# Patient Record
Sex: MALE | Race: WHITE | Employment: OTHER | URBAN - METROPOLITAN AREA
[De-identification: names, ages, dates, MRNs, and addresses within clinical notes are randomized per-mention and may not be internally consistent; named-entity substitution may affect disease eponyms.]

---

## 2017-03-09 ENCOUNTER — HOSPITAL ENCOUNTER (OUTPATIENT)
Dept: PREADMISSION TESTING | Age: 77
Discharge: HOME | End: 2017-03-09
Payer: MEDICARE

## 2017-03-09 VITALS — BODY MASS INDEX: 29.92 KG/M2 | HEIGHT: 69 IN | WEIGHT: 202 LBS

## 2017-03-09 LAB
ALBUMIN SERPL BCP-MCNC: 3.6 G/DL (ref 3.4–5)
ALBUMIN/GLOB SERPL: 1 {RATIO} (ref 0.8–1.7)
ALP SERPL-CCNC: 166 U/L (ref 45–117)
ALT SERPL-CCNC: 39 U/L (ref 16–61)
ANION GAP BLD CALC-SCNC: 10 MMOL/L (ref 3–18)
APPEARANCE UR: CLEAR
APTT PPP: 30.6 SEC (ref 23–36.4)
AST SERPL W P-5'-P-CCNC: 24 U/L (ref 15–37)
BACTERIA SPEC CULT: NORMAL
BASOPHILS # BLD AUTO: 0 K/UL (ref 0–0.06)
BASOPHILS # BLD: 1 % (ref 0–2)
BILIRUB SERPL-MCNC: 0.5 MG/DL (ref 0.2–1)
BILIRUB UR QL: NEGATIVE
BUN SERPL-MCNC: 21 MG/DL (ref 7–18)
BUN/CREAT SERPL: 23 (ref 12–20)
CALCIUM SERPL-MCNC: 8.7 MG/DL (ref 8.5–10.1)
CHLORIDE SERPL-SCNC: 103 MMOL/L (ref 100–108)
CO2 SERPL-SCNC: 26 MMOL/L (ref 21–32)
COLOR UR: YELLOW
CREAT SERPL-MCNC: 0.9 MG/DL (ref 0.6–1.3)
DIFFERENTIAL METHOD BLD: ABNORMAL
EOSINOPHIL # BLD: 0.1 K/UL (ref 0–0.4)
EOSINOPHIL NFR BLD: 3 % (ref 0–5)
ERYTHROCYTE [DISTWIDTH] IN BLOOD BY AUTOMATED COUNT: 12.4 % (ref 11.6–14.5)
ERYTHROCYTE [SEDIMENTATION RATE] IN BLOOD: 13 MM/HR (ref 0–20)
EST. AVERAGE GLUCOSE BLD GHB EST-MCNC: 126 MG/DL
GLOBULIN SER CALC-MCNC: 3.5 G/DL (ref 2–4)
GLUCOSE SERPL-MCNC: 111 MG/DL (ref 74–99)
GLUCOSE UR STRIP.AUTO-MCNC: NEGATIVE MG/DL
HBA1C MFR BLD: 6 % (ref 4.5–5.6)
HCT VFR BLD AUTO: 40.8 % (ref 36–48)
HGB BLD-MCNC: 13.6 G/DL (ref 13–16)
HGB UR QL STRIP: NEGATIVE
INR PPP: 0.9 (ref 0.8–1.2)
KETONES UR QL STRIP.AUTO: NEGATIVE MG/DL
LEUKOCYTE ESTERASE UR QL STRIP.AUTO: NEGATIVE
LYMPHOCYTES # BLD AUTO: 29 % (ref 21–52)
LYMPHOCYTES # BLD: 1.3 K/UL (ref 0.9–3.6)
MCH RBC QN AUTO: 32.9 PG (ref 24–34)
MCHC RBC AUTO-ENTMCNC: 33.3 G/DL (ref 31–37)
MCV RBC AUTO: 98.8 FL (ref 74–97)
MONOCYTES # BLD: 0.5 K/UL (ref 0.05–1.2)
MONOCYTES NFR BLD AUTO: 12 % (ref 3–10)
NEUTS SEG # BLD: 2.4 K/UL (ref 1.8–8)
NEUTS SEG NFR BLD AUTO: 55 % (ref 40–73)
NITRITE UR QL STRIP.AUTO: NEGATIVE
PH UR STRIP: 5 [PH] (ref 5–8)
PLATELET # BLD AUTO: 198 K/UL (ref 135–420)
PMV BLD AUTO: 10 FL (ref 9.2–11.8)
POTASSIUM SERPL-SCNC: 4.2 MMOL/L (ref 3.5–5.5)
PROT SERPL-MCNC: 7.1 G/DL (ref 6.4–8.2)
PROT UR STRIP-MCNC: NEGATIVE MG/DL
PROTHROMBIN TIME: 11.8 SEC (ref 11.5–15.2)
RBC # BLD AUTO: 4.13 M/UL (ref 4.7–5.5)
SERVICE CMNT-IMP: NORMAL
SODIUM SERPL-SCNC: 139 MMOL/L (ref 136–145)
SP GR UR REFRACTOMETRY: 1.01 (ref 1–1.03)
UROBILINOGEN UR QL STRIP.AUTO: 0.2 EU/DL (ref 0.2–1)
WBC # BLD AUTO: 4.4 K/UL (ref 4.6–13.2)

## 2017-03-09 PROCEDURE — 83036 HEMOGLOBIN GLYCOSYLATED A1C: CPT | Performed by: ORTHOPAEDIC SURGERY

## 2017-03-09 PROCEDURE — 80053 COMPREHEN METABOLIC PANEL: CPT | Performed by: ORTHOPAEDIC SURGERY

## 2017-03-09 PROCEDURE — 93005 ELECTROCARDIOGRAM TRACING: CPT

## 2017-03-09 PROCEDURE — 85610 PROTHROMBIN TIME: CPT | Performed by: ORTHOPAEDIC SURGERY

## 2017-03-09 PROCEDURE — 85652 RBC SED RATE AUTOMATED: CPT | Performed by: ORTHOPAEDIC SURGERY

## 2017-03-09 PROCEDURE — 85025 COMPLETE CBC W/AUTO DIFF WBC: CPT | Performed by: ORTHOPAEDIC SURGERY

## 2017-03-09 PROCEDURE — 81003 URINALYSIS AUTO W/O SCOPE: CPT | Performed by: ORTHOPAEDIC SURGERY

## 2017-03-09 PROCEDURE — 85730 THROMBOPLASTIN TIME PARTIAL: CPT | Performed by: ORTHOPAEDIC SURGERY

## 2017-03-09 PROCEDURE — 87641 MR-STAPH DNA AMP PROBE: CPT | Performed by: ORTHOPAEDIC SURGERY

## 2017-03-09 RX ORDER — GLUCOSAMINE/CHONDR SU A SOD 750-600 MG
2 TABLET ORAL DAILY
COMMUNITY
End: 2017-04-04

## 2017-03-09 RX ORDER — ASCORBIC ACID 500 MG
500 TABLET ORAL DAILY
COMMUNITY

## 2017-03-09 RX ORDER — FISH OIL/DHA/EPA 1200-144MG
CAPSULE ORAL 2 TIMES DAILY
COMMUNITY
End: 2017-04-04

## 2017-03-09 RX ORDER — ASPIRIN 81 MG/1
81 TABLET ORAL DAILY
COMMUNITY
End: 2017-04-04

## 2017-03-09 RX ORDER — CEFAZOLIN SODIUM 2 G/50ML
2 SOLUTION INTRAVENOUS ONCE
Status: CANCELLED | OUTPATIENT
Start: 2017-03-09 | End: 2017-03-09

## 2017-03-09 RX ORDER — LEVOTHYROXINE SODIUM 75 UG/1
75 TABLET ORAL
COMMUNITY

## 2017-03-09 RX ORDER — CHOLECALCIFEROL (VITAMIN D3) 125 MCG
CAPSULE ORAL
COMMUNITY

## 2017-03-09 RX ORDER — SODIUM CHLORIDE, SODIUM LACTATE, POTASSIUM CHLORIDE, CALCIUM CHLORIDE 600; 310; 30; 20 MG/100ML; MG/100ML; MG/100ML; MG/100ML
125 INJECTION, SOLUTION INTRAVENOUS CONTINUOUS
Status: CANCELLED | OUTPATIENT
Start: 2017-03-09

## 2017-03-09 RX ORDER — FEXOFENADINE HCL AND PSEUDOEPHEDRINE HCI 180; 240 MG/1; MG/1
1 TABLET, EXTENDED RELEASE ORAL DAILY
COMMUNITY

## 2017-03-09 RX ORDER — TAMSULOSIN HYDROCHLORIDE 0.4 MG/1
0.4 CAPSULE ORAL DAILY
COMMUNITY

## 2017-03-09 RX ORDER — CITALOPRAM 20 MG/1
20 TABLET, FILM COATED ORAL DAILY
COMMUNITY

## 2017-03-10 LAB
ATRIAL RATE: 66 BPM
CALCULATED P AXIS, ECG09: 67 DEGREES
CALCULATED R AXIS, ECG10: 69 DEGREES
CALCULATED T AXIS, ECG11: 54 DEGREES
DIAGNOSIS, 93000: NORMAL
P-R INTERVAL, ECG05: 128 MS
Q-T INTERVAL, ECG07: 398 MS
QRS DURATION, ECG06: 94 MS
QTC CALCULATION (BEZET), ECG08: 417 MS
VENTRICULAR RATE, ECG03: 66 BPM

## 2017-03-31 ENCOUNTER — ANESTHESIA EVENT (OUTPATIENT)
Dept: SURGERY | Age: 77
DRG: 470 | End: 2017-03-31
Payer: MEDICARE

## 2017-04-03 ENCOUNTER — APPOINTMENT (OUTPATIENT)
Dept: GENERAL RADIOLOGY | Age: 77
DRG: 470 | End: 2017-04-03
Attending: PHYSICIAN ASSISTANT
Payer: MEDICARE

## 2017-04-03 ENCOUNTER — HOSPITAL ENCOUNTER (INPATIENT)
Age: 77
LOS: 1 days | Discharge: HOME HEALTH | DRG: 470 | End: 2017-04-04
Attending: ORTHOPAEDIC SURGERY | Admitting: ORTHOPAEDIC SURGERY
Payer: MEDICARE

## 2017-04-03 ENCOUNTER — ANESTHESIA (OUTPATIENT)
Dept: SURGERY | Age: 77
DRG: 470 | End: 2017-04-03
Payer: MEDICARE

## 2017-04-03 LAB
ABO + RH BLD: NORMAL
BLOOD GROUP ANTIBODIES SERPL: NORMAL
GLUCOSE BLD STRIP.AUTO-MCNC: 89 MG/DL (ref 70–110)
SPECIMEN EXP DATE BLD: NORMAL

## 2017-04-03 PROCEDURE — 74011250636 HC RX REV CODE- 250/636

## 2017-04-03 PROCEDURE — 65270000029 HC RM PRIVATE

## 2017-04-03 PROCEDURE — 77030002933 HC SUT MCRYL J&J -A: Performed by: ORTHOPAEDIC SURGERY

## 2017-04-03 PROCEDURE — 77030011256 HC DRSG MEPILEX <16IN NO BORD MOLN -A: Performed by: ORTHOPAEDIC SURGERY

## 2017-04-03 PROCEDURE — 0SRD0JA REPLACEMENT OF LEFT KNEE JOINT WITH SYNTHETIC SUBSTITUTE, UNCEMENTED, OPEN APPROACH: ICD-10-PCS | Performed by: ORTHOPAEDIC SURGERY

## 2017-04-03 PROCEDURE — 77030031139 HC SUT VCRL2 J&J -A: Performed by: ORTHOPAEDIC SURGERY

## 2017-04-03 PROCEDURE — 77030012508 HC MSK AIRWY LMA AMBU -A: Performed by: SPECIALIST

## 2017-04-03 PROCEDURE — C1776 JOINT DEVICE (IMPLANTABLE): HCPCS | Performed by: ORTHOPAEDIC SURGERY

## 2017-04-03 PROCEDURE — 77030034479 HC ADH SKN CLSR PRINEO J&J -B: Performed by: ORTHOPAEDIC SURGERY

## 2017-04-03 PROCEDURE — 77030018835 HC SOL IRR LR ICUM -A: Performed by: ORTHOPAEDIC SURGERY

## 2017-04-03 PROCEDURE — 74011250636 HC RX REV CODE- 250/636: Performed by: ORTHOPAEDIC SURGERY

## 2017-04-03 PROCEDURE — 77030027355 HC HNDPC IRR SURGLAV STRY -B: Performed by: ORTHOPAEDIC SURGERY

## 2017-04-03 PROCEDURE — 74011000250 HC RX REV CODE- 250

## 2017-04-03 PROCEDURE — 97116 GAIT TRAINING THERAPY: CPT

## 2017-04-03 PROCEDURE — 77030016060 HC NDL NRV BLK TELE -A: Performed by: ANESTHESIOLOGY

## 2017-04-03 PROCEDURE — 77030027138 HC INCENT SPIROMETER -A: Performed by: ORTHOPAEDIC SURGERY

## 2017-04-03 PROCEDURE — 77030032489 HC SLV COMPR SCD FT CUF COVD -B: Performed by: ORTHOPAEDIC SURGERY

## 2017-04-03 PROCEDURE — 74011250637 HC RX REV CODE- 250/637: Performed by: PHYSICIAN ASSISTANT

## 2017-04-03 PROCEDURE — 36415 COLL VENOUS BLD VENIPUNCTURE: CPT | Performed by: ORTHOPAEDIC SURGERY

## 2017-04-03 PROCEDURE — 77030018846 HC SOL IRR STRL H20 ICUM -A: Performed by: ORTHOPAEDIC SURGERY

## 2017-04-03 PROCEDURE — 51798 US URINE CAPACITY MEASURE: CPT

## 2017-04-03 PROCEDURE — 77030011640 HC PAD GRND REM COVD -A: Performed by: ORTHOPAEDIC SURGERY

## 2017-04-03 PROCEDURE — 74011000258 HC RX REV CODE- 258

## 2017-04-03 PROCEDURE — 74011000258 HC RX REV CODE- 258: Performed by: ORTHOPAEDIC SURGERY

## 2017-04-03 PROCEDURE — 77030012893

## 2017-04-03 PROCEDURE — 77030033067 HC SUT PDO STRATFX SPIR J&J -B: Performed by: ORTHOPAEDIC SURGERY

## 2017-04-03 PROCEDURE — 74011250636 HC RX REV CODE- 250/636: Performed by: PHYSICIAN ASSISTANT

## 2017-04-03 PROCEDURE — 73560 X-RAY EXAM OF KNEE 1 OR 2: CPT

## 2017-04-03 PROCEDURE — 74011000250 HC RX REV CODE- 250: Performed by: PHYSICIAN ASSISTANT

## 2017-04-03 PROCEDURE — 76010000132 HC OR TIME 2.5 TO 3 HR: Performed by: ORTHOPAEDIC SURGERY

## 2017-04-03 PROCEDURE — 77030020782 HC GWN BAIR PAWS FLX 3M -B: Performed by: ORTHOPAEDIC SURGERY

## 2017-04-03 PROCEDURE — 74011000258 HC RX REV CODE- 258: Performed by: PHYSICIAN ASSISTANT

## 2017-04-03 PROCEDURE — 77030036563 HC WRP CLD THER KNE S2SG -B: Performed by: ORTHOPAEDIC SURGERY

## 2017-04-03 PROCEDURE — 64447 NJX AA&/STRD FEMORAL NRV IMG: CPT | Performed by: ANESTHESIOLOGY

## 2017-04-03 PROCEDURE — 82962 GLUCOSE BLOOD TEST: CPT

## 2017-04-03 PROCEDURE — 77030035643 HC BLD SAW OSC PRECIS STRY -C: Performed by: ORTHOPAEDIC SURGERY

## 2017-04-03 PROCEDURE — 76210000016 HC OR PH I REC 1 TO 1.5 HR: Performed by: ORTHOPAEDIC SURGERY

## 2017-04-03 PROCEDURE — 77030018836 HC SOL IRR NACL ICUM -A: Performed by: ORTHOPAEDIC SURGERY

## 2017-04-03 PROCEDURE — 74011000250 HC RX REV CODE- 250: Performed by: ORTHOPAEDIC SURGERY

## 2017-04-03 PROCEDURE — 86900 BLOOD TYPING SEROLOGIC ABO: CPT | Performed by: ORTHOPAEDIC SURGERY

## 2017-04-03 PROCEDURE — C9290 INJ, BUPIVACAINE LIPOSOME: HCPCS | Performed by: ORTHOPAEDIC SURGERY

## 2017-04-03 PROCEDURE — 77030003666 HC NDL SPINAL BD -A: Performed by: ORTHOPAEDIC SURGERY

## 2017-04-03 PROCEDURE — 97161 PT EVAL LOW COMPLEX 20 MIN: CPT

## 2017-04-03 PROCEDURE — 77030011264 HC ELECTRD BLD EXT COVD -A: Performed by: ORTHOPAEDIC SURGERY

## 2017-04-03 PROCEDURE — 74011250637 HC RX REV CODE- 250/637: Performed by: ANESTHESIOLOGY

## 2017-04-03 PROCEDURE — 77030020754 HC CUF TRNQT 2BLA STRY -B: Performed by: ORTHOPAEDIC SURGERY

## 2017-04-03 PROCEDURE — 76942 ECHO GUIDE FOR BIOPSY: CPT | Performed by: ORTHOPAEDIC SURGERY

## 2017-04-03 PROCEDURE — 76060000036 HC ANESTHESIA 2.5 TO 3 HR: Performed by: ORTHOPAEDIC SURGERY

## 2017-04-03 PROCEDURE — 74011250636 HC RX REV CODE- 250/636: Performed by: ANESTHESIOLOGY

## 2017-04-03 DEVICE — PAT ASYM MTL-BK 11MM SZ A38 -- TRIATHLON: Type: IMPLANTABLE DEVICE | Site: KNEE | Status: FUNCTIONAL

## 2017-04-03 DEVICE — INSERT TIB ARTC BRNG SZ6 9MM -- TRIATHLON: Type: IMPLANTABLE DEVICE | Site: KNEE | Status: FUNCTIONAL

## 2017-04-03 DEVICE — BASEPLATE TIB SZ 6 AP52MM ML77MM KNEE TRITANIUM 4 CRUCFRM: Type: IMPLANTABLE DEVICE | Site: KNEE | Status: FUNCTIONAL

## 2017-04-03 DEVICE — COMPONENT TOT KNEE HYBRID POROUS X3 TRIATHLON: Type: IMPLANTABLE DEVICE | Site: KNEE | Status: FUNCTIONAL

## 2017-04-03 DEVICE — COMPNT FEM CR TRIATHLN 7 L PA -- MOR-KNEE: Type: IMPLANTABLE DEVICE | Site: KNEE | Status: FUNCTIONAL

## 2017-04-03 RX ORDER — SODIUM CHLORIDE, SODIUM LACTATE, POTASSIUM CHLORIDE, CALCIUM CHLORIDE 600; 310; 30; 20 MG/100ML; MG/100ML; MG/100ML; MG/100ML
125 INJECTION, SOLUTION INTRAVENOUS CONTINUOUS
Status: DISCONTINUED | OUTPATIENT
Start: 2017-04-03 | End: 2017-04-04 | Stop reason: HOSPADM

## 2017-04-03 RX ORDER — LIDOCAINE HYDROCHLORIDE 20 MG/ML
INJECTION, SOLUTION EPIDURAL; INFILTRATION; INTRACAUDAL; PERINEURAL AS NEEDED
Status: DISCONTINUED | OUTPATIENT
Start: 2017-04-03 | End: 2017-04-03 | Stop reason: HOSPADM

## 2017-04-03 RX ORDER — ACETAMINOPHEN 10 MG/ML
1000 INJECTION, SOLUTION INTRAVENOUS ONCE
Status: COMPLETED | OUTPATIENT
Start: 2017-04-03 | End: 2017-04-03

## 2017-04-03 RX ORDER — SODIUM CHLORIDE, SODIUM LACTATE, POTASSIUM CHLORIDE, CALCIUM CHLORIDE 600; 310; 30; 20 MG/100ML; MG/100ML; MG/100ML; MG/100ML
125 INJECTION, SOLUTION INTRAVENOUS CONTINUOUS
Status: DISCONTINUED | OUTPATIENT
Start: 2017-04-03 | End: 2017-04-03 | Stop reason: HOSPADM

## 2017-04-03 RX ORDER — LORATADINE AND PSEUDOEPHEDRINE 10; 240 MG/1; MG/1
TABLET, EXTENDED RELEASE ORAL DAILY
Status: DISCONTINUED | OUTPATIENT
Start: 2017-04-04 | End: 2017-04-04 | Stop reason: HOSPADM

## 2017-04-03 RX ORDER — ATORVASTATIN CALCIUM 20 MG/1
20 TABLET, FILM COATED ORAL DAILY
Status: DISCONTINUED | OUTPATIENT
Start: 2017-04-03 | End: 2017-04-04 | Stop reason: HOSPADM

## 2017-04-03 RX ORDER — FENTANYL CITRATE 50 UG/ML
INJECTION, SOLUTION INTRAMUSCULAR; INTRAVENOUS AS NEEDED
Status: DISCONTINUED | OUTPATIENT
Start: 2017-04-03 | End: 2017-04-03 | Stop reason: HOSPADM

## 2017-04-03 RX ORDER — OXYCODONE HYDROCHLORIDE 5 MG/1
5-10 TABLET ORAL
Status: DISCONTINUED | OUTPATIENT
Start: 2017-04-03 | End: 2017-04-04 | Stop reason: HOSPADM

## 2017-04-03 RX ORDER — LANOLIN ALCOHOL/MO/W.PET/CERES
1 CREAM (GRAM) TOPICAL
Status: DISCONTINUED | OUTPATIENT
Start: 2017-04-04 | End: 2017-04-04 | Stop reason: HOSPADM

## 2017-04-03 RX ORDER — PREGABALIN 75 MG/1
75 CAPSULE ORAL
Status: COMPLETED | OUTPATIENT
Start: 2017-04-03 | End: 2017-04-03

## 2017-04-03 RX ORDER — SODIUM CHLORIDE, SODIUM LACTATE, POTASSIUM CHLORIDE, CALCIUM CHLORIDE 600; 310; 30; 20 MG/100ML; MG/100ML; MG/100ML; MG/100ML
75 INJECTION, SOLUTION INTRAVENOUS CONTINUOUS
Status: DISPENSED | OUTPATIENT
Start: 2017-04-03 | End: 2017-04-04

## 2017-04-03 RX ORDER — ONDANSETRON 2 MG/ML
INJECTION INTRAMUSCULAR; INTRAVENOUS AS NEEDED
Status: DISCONTINUED | OUTPATIENT
Start: 2017-04-03 | End: 2017-04-03 | Stop reason: HOSPADM

## 2017-04-03 RX ORDER — MIDAZOLAM HYDROCHLORIDE 1 MG/ML
INJECTION, SOLUTION INTRAMUSCULAR; INTRAVENOUS AS NEEDED
Status: DISCONTINUED | OUTPATIENT
Start: 2017-04-03 | End: 2017-04-03 | Stop reason: HOSPADM

## 2017-04-03 RX ORDER — CELECOXIB 100 MG/1
400 CAPSULE ORAL
Status: COMPLETED | OUTPATIENT
Start: 2017-04-03 | End: 2017-04-03

## 2017-04-03 RX ORDER — DOCUSATE SODIUM 100 MG/1
100 CAPSULE, LIQUID FILLED ORAL 2 TIMES DAILY
Status: DISCONTINUED | OUTPATIENT
Start: 2017-04-03 | End: 2017-04-04 | Stop reason: HOSPADM

## 2017-04-03 RX ORDER — PROPOFOL 10 MG/ML
INJECTION, EMULSION INTRAVENOUS AS NEEDED
Status: DISCONTINUED | OUTPATIENT
Start: 2017-04-03 | End: 2017-04-03 | Stop reason: HOSPADM

## 2017-04-03 RX ORDER — GLYCOPYRROLATE 0.2 MG/ML
INJECTION INTRAMUSCULAR; INTRAVENOUS AS NEEDED
Status: DISCONTINUED | OUTPATIENT
Start: 2017-04-03 | End: 2017-04-03 | Stop reason: HOSPADM

## 2017-04-03 RX ORDER — CEFAZOLIN SODIUM 2 G/50ML
2 SOLUTION INTRAVENOUS EVERY 8 HOURS
Status: COMPLETED | OUTPATIENT
Start: 2017-04-03 | End: 2017-04-04

## 2017-04-03 RX ORDER — ASPIRIN 81 MG/1
81 TABLET ORAL 2 TIMES DAILY
Status: DISCONTINUED | OUTPATIENT
Start: 2017-04-03 | End: 2017-04-04 | Stop reason: HOSPADM

## 2017-04-03 RX ORDER — SODIUM CHLORIDE 0.9 % (FLUSH) 0.9 %
5-10 SYRINGE (ML) INJECTION AS NEEDED
Status: DISCONTINUED | OUTPATIENT
Start: 2017-04-03 | End: 2017-04-04 | Stop reason: HOSPADM

## 2017-04-03 RX ORDER — OXYCODONE HYDROCHLORIDE 5 MG/1
5 TABLET ORAL ONCE
Status: COMPLETED | OUTPATIENT
Start: 2017-04-03 | End: 2017-04-03

## 2017-04-03 RX ORDER — DIPHENHYDRAMINE HCL 25 MG
25 CAPSULE ORAL
Status: DISCONTINUED | OUTPATIENT
Start: 2017-04-03 | End: 2017-04-04 | Stop reason: HOSPADM

## 2017-04-03 RX ORDER — HYDROMORPHONE HYDROCHLORIDE 2 MG/ML
0.5 INJECTION, SOLUTION INTRAMUSCULAR; INTRAVENOUS; SUBCUTANEOUS
Status: DISCONTINUED | OUTPATIENT
Start: 2017-04-03 | End: 2017-04-03 | Stop reason: HOSPADM

## 2017-04-03 RX ORDER — LEVOTHYROXINE SODIUM 75 UG/1
75 TABLET ORAL
Status: DISCONTINUED | OUTPATIENT
Start: 2017-04-04 | End: 2017-04-04 | Stop reason: HOSPADM

## 2017-04-03 RX ORDER — TAMSULOSIN HYDROCHLORIDE 0.4 MG/1
0.4 CAPSULE ORAL DAILY
Status: DISCONTINUED | OUTPATIENT
Start: 2017-04-03 | End: 2017-04-04 | Stop reason: HOSPADM

## 2017-04-03 RX ORDER — SODIUM CHLORIDE 0.9 % (FLUSH) 0.9 %
5-10 SYRINGE (ML) INJECTION AS NEEDED
Status: DISCONTINUED | OUTPATIENT
Start: 2017-04-03 | End: 2017-04-03 | Stop reason: HOSPADM

## 2017-04-03 RX ORDER — ROPIVACAINE HYDROCHLORIDE 5 MG/ML
INJECTION, SOLUTION EPIDURAL; INFILTRATION; PERINEURAL AS NEEDED
Status: DISCONTINUED | OUTPATIENT
Start: 2017-04-03 | End: 2017-04-03 | Stop reason: HOSPADM

## 2017-04-03 RX ORDER — NALOXONE HYDROCHLORIDE 0.4 MG/ML
0.4 INJECTION, SOLUTION INTRAMUSCULAR; INTRAVENOUS; SUBCUTANEOUS AS NEEDED
Status: DISCONTINUED | OUTPATIENT
Start: 2017-04-03 | End: 2017-04-04 | Stop reason: HOSPADM

## 2017-04-03 RX ORDER — CEFAZOLIN SODIUM 2 G/50ML
2 SOLUTION INTRAVENOUS ONCE
Status: COMPLETED | OUTPATIENT
Start: 2017-04-03 | End: 2017-04-03

## 2017-04-03 RX ORDER — CITALOPRAM 20 MG/1
20 TABLET, FILM COATED ORAL DAILY
Status: DISCONTINUED | OUTPATIENT
Start: 2017-04-03 | End: 2017-04-04 | Stop reason: HOSPADM

## 2017-04-03 RX ORDER — SODIUM CHLORIDE 0.9 % (FLUSH) 0.9 %
5-10 SYRINGE (ML) INJECTION EVERY 8 HOURS
Status: DISCONTINUED | OUTPATIENT
Start: 2017-04-03 | End: 2017-04-04 | Stop reason: HOSPADM

## 2017-04-03 RX ORDER — LORAZEPAM 2 MG/ML
1 INJECTION INTRAMUSCULAR
Status: DISCONTINUED | OUTPATIENT
Start: 2017-04-03 | End: 2017-04-04 | Stop reason: HOSPADM

## 2017-04-03 RX ORDER — EPHEDRINE SULFATE/0.9% NACL/PF 25 MG/5 ML
SYRINGE (ML) INTRAVENOUS AS NEEDED
Status: DISCONTINUED | OUTPATIENT
Start: 2017-04-03 | End: 2017-04-03 | Stop reason: HOSPADM

## 2017-04-03 RX ORDER — LOSARTAN POTASSIUM 25 MG/1
25 TABLET ORAL DAILY
Status: DISCONTINUED | OUTPATIENT
Start: 2017-04-03 | End: 2017-04-04 | Stop reason: HOSPADM

## 2017-04-03 RX ORDER — HYDROMORPHONE HYDROCHLORIDE 1 MG/ML
1 INJECTION, SOLUTION INTRAMUSCULAR; INTRAVENOUS; SUBCUTANEOUS
Status: DISCONTINUED | OUTPATIENT
Start: 2017-04-03 | End: 2017-04-04 | Stop reason: HOSPADM

## 2017-04-03 RX ORDER — ACETAMINOPHEN 10 MG/ML
1000 INJECTION, SOLUTION INTRAVENOUS EVERY 8 HOURS
Status: COMPLETED | OUTPATIENT
Start: 2017-04-03 | End: 2017-04-04

## 2017-04-03 RX ADMIN — FENTANYL CITRATE 25 MCG: 50 INJECTION, SOLUTION INTRAMUSCULAR; INTRAVENOUS at 09:00

## 2017-04-03 RX ADMIN — Medication 5 MG: at 09:42

## 2017-04-03 RX ADMIN — ACETAMINOPHEN 1000 MG: 10 INJECTION, SOLUTION INTRAVENOUS at 08:10

## 2017-04-03 RX ADMIN — FENTANYL CITRATE 25 MCG: 50 INJECTION, SOLUTION INTRAMUSCULAR; INTRAVENOUS at 09:36

## 2017-04-03 RX ADMIN — FENTANYL CITRATE 25 MCG: 50 INJECTION, SOLUTION INTRAMUSCULAR; INTRAVENOUS at 08:45

## 2017-04-03 RX ADMIN — DOCUSATE SODIUM 100 MG: 100 CAPSULE, LIQUID FILLED ORAL at 21:24

## 2017-04-03 RX ADMIN — LIDOCAINE HYDROCHLORIDE 100 MG: 20 INJECTION, SOLUTION EPIDURAL; INFILTRATION; INTRACAUDAL; PERINEURAL at 07:56

## 2017-04-03 RX ADMIN — TAMSULOSIN HYDROCHLORIDE 0.4 MG: 0.4 CAPSULE ORAL at 12:00

## 2017-04-03 RX ADMIN — SODIUM CHLORIDE, SODIUM LACTATE, POTASSIUM CHLORIDE, AND CALCIUM CHLORIDE 125 ML/HR: 600; 310; 30; 20 INJECTION, SOLUTION INTRAVENOUS at 06:32

## 2017-04-03 RX ADMIN — SODIUM CHLORIDE, SODIUM LACTATE, POTASSIUM CHLORIDE, AND CALCIUM CHLORIDE 125 ML/HR: 600; 310; 30; 20 INJECTION, SOLUTION INTRAVENOUS at 10:43

## 2017-04-03 RX ADMIN — SODIUM CHLORIDE, SODIUM LACTATE, POTASSIUM CHLORIDE, AND CALCIUM CHLORIDE: 600; 310; 30; 20 INJECTION, SOLUTION INTRAVENOUS at 08:36

## 2017-04-03 RX ADMIN — OXYCODONE HYDROCHLORIDE 10 MG: 5 TABLET ORAL at 16:45

## 2017-04-03 RX ADMIN — OXYCODONE HYDROCHLORIDE 10 MG: 5 TABLET ORAL at 12:49

## 2017-04-03 RX ADMIN — ROPIVACAINE HYDROCHLORIDE 20 ML: 5 INJECTION, SOLUTION EPIDURAL; INFILTRATION; PERINEURAL at 07:46

## 2017-04-03 RX ADMIN — ATORVASTATIN CALCIUM 20 MG: 20 TABLET, FILM COATED ORAL at 12:47

## 2017-04-03 RX ADMIN — PREGABALIN 75 MG: 75 CAPSULE ORAL at 07:35

## 2017-04-03 RX ADMIN — ONDANSETRON 4 MG: 2 INJECTION INTRAMUSCULAR; INTRAVENOUS at 07:50

## 2017-04-03 RX ADMIN — ASPIRIN 81 MG: 81 TABLET, COATED ORAL at 12:47

## 2017-04-03 RX ADMIN — DOCUSATE SODIUM 100 MG: 100 CAPSULE, LIQUID FILLED ORAL at 12:47

## 2017-04-03 RX ADMIN — Medication 5 MG: at 09:41

## 2017-04-03 RX ADMIN — CEFAZOLIN SODIUM 2 G: 2 SOLUTION INTRAVENOUS at 16:17

## 2017-04-03 RX ADMIN — MIDAZOLAM HYDROCHLORIDE 2 MG: 1 INJECTION, SOLUTION INTRAMUSCULAR; INTRAVENOUS at 07:41

## 2017-04-03 RX ADMIN — CEFAZOLIN SODIUM 2 G: 2 SOLUTION INTRAVENOUS at 23:43

## 2017-04-03 RX ADMIN — Medication 5 MG: at 08:19

## 2017-04-03 RX ADMIN — GLYCOPYRROLATE 0.2 MG: 0.2 INJECTION INTRAMUSCULAR; INTRAVENOUS at 07:50

## 2017-04-03 RX ADMIN — CELECOXIB 400 MG: 100 CAPSULE ORAL at 07:35

## 2017-04-03 RX ADMIN — FENTANYL CITRATE 100 MCG: 50 INJECTION, SOLUTION INTRAMUSCULAR; INTRAVENOUS at 07:41

## 2017-04-03 RX ADMIN — Medication 10 MG: at 08:00

## 2017-04-03 RX ADMIN — Medication 5 MG: at 08:03

## 2017-04-03 RX ADMIN — Medication 5 MG: at 08:07

## 2017-04-03 RX ADMIN — OXYCODONE HYDROCHLORIDE 10 MG: 5 TABLET ORAL at 21:24

## 2017-04-03 RX ADMIN — PROPOFOL 180 MG: 10 INJECTION, EMULSION INTRAVENOUS at 07:56

## 2017-04-03 RX ADMIN — SODIUM CHLORIDE, SODIUM LACTATE, POTASSIUM CHLORIDE, AND CALCIUM CHLORIDE 75 ML/HR: 600; 310; 30; 20 INJECTION, SOLUTION INTRAVENOUS at 12:48

## 2017-04-03 RX ADMIN — CITALOPRAM HYDROBROMIDE 20 MG: 20 TABLET ORAL at 12:00

## 2017-04-03 RX ADMIN — SODIUM CHLORIDE 1 G: 900 INJECTION, SOLUTION INTRAVENOUS at 09:37

## 2017-04-03 RX ADMIN — ACETAMINOPHEN 1000 MG: 10 INJECTION, SOLUTION INTRAVENOUS at 14:30

## 2017-04-03 RX ADMIN — CEFAZOLIN SODIUM 2 G: 2 SOLUTION INTRAVENOUS at 07:52

## 2017-04-03 RX ADMIN — OXYCODONE HYDROCHLORIDE 5 MG: 5 TABLET ORAL at 07:35

## 2017-04-03 RX ADMIN — SODIUM CHLORIDE 1 G: 900 INJECTION, SOLUTION INTRAVENOUS at 08:05

## 2017-04-03 RX ADMIN — FENTANYL CITRATE 25 MCG: 50 INJECTION, SOLUTION INTRAMUSCULAR; INTRAVENOUS at 08:29

## 2017-04-03 RX ADMIN — ASPIRIN 81 MG: 81 TABLET, COATED ORAL at 21:24

## 2017-04-03 RX ADMIN — Medication 5 MG: at 08:11

## 2017-04-03 RX ADMIN — LOSARTAN POTASSIUM 25 MG: 25 TABLET, FILM COATED ORAL at 12:00

## 2017-04-03 RX ADMIN — ACETAMINOPHEN 1000 MG: 10 INJECTION, SOLUTION INTRAVENOUS at 21:24

## 2017-04-03 NOTE — PERIOP NOTES
Patient transfer to room 205. Family notified. Handoff with Tasha HOLDER. Blood pressure 129/68, pulse 64, temperature 97.8 °F (36.6 °C), resp. rate 20, height 5' 10.5\" (1.791 m), weight 89.6 kg (197 lb 7 oz), SpO2 100 %.

## 2017-04-03 NOTE — PROGRESS NOTES
1150 - Patient arrives to unit at this time. Admission completed at this time. Patient is A/O x 4. IV to Left hand intact and patent. Plexis to bilateral feet and TEDs applied to right leg. Ace wrap dressing to left leg CDI. No numbness/tingling. Pedal pulses palpable. Pain 6/10. Patient was oriented to the room to include use of call bell, meal ordering, and use of incentive spirometer. Patient was given explanation of \" up for dinner\" program and has verbalized understanding. Phone and call bell left within reach. Plan of care for the day addressed with patient. Educated on pain medication availability and possible side effects. 1247- Pain to left knee rated at 6/10. Pain medication given. 1300-Ambulating in room and into hallway with PT.    1340 Pain decreased to 4/10. Patient sitting on side of bed with wife at bedside. 1645-Pain medication given for pain rated at 7/10.    1645-Pain rated 7/10, pain medication given. 1738-Pain decreased to 2/10.    2124-Pain medication given for pain rated 6/10. Patient also c/o only trickle of urination, bladder distended. 2135-Bladder scan showed >999.    2145-Straight cath with 15 FR catheter, 950 cc of clear, yellow urine out. Patient voiced relief. 2230- Pain decreased to 2/10. Shift summary- Patient ambulated in with PT and up to bathroom with one assist and use of walker, gait steady. Small amount of urine out. Patient attempting to urinate many times with small amount out. Bladder scan showed >999. Straight cathed with 950 out. Pain under control with oxycodone IR 10 mg. Resting quielty in bed with NAD.

## 2017-04-03 NOTE — PROGRESS NOTES
conducted a pre-surgery visit with Jaime Hastings, who is a 78 y. o.,male. The  provided the following Interventions:  Initiated a relationship of care and support. Offered prayer and assurance of continued prayers on patient's behalf. Plan:  Chaplains will continue to follow and will provide pastoral care on an as needed/requested basis.  recommends bedside caregivers page  on duty if patient shows signs of acute spiritual or emotional distress.     [address] [facility],[address], [address] [facility] office  [phone] pager

## 2017-04-03 NOTE — OP NOTES
[address] [address],[address]   Total Left Knee Arthroplasty          Date of Surgery: 4/3/2017   Preoperative Diagnosis: OSTEOARTHRITIS LEFT KNEE   Postoperative Diagnosis: OSTEOARTHRITIS LEFT KNEE   Location: [facility]  Surgeon: Roland Grace MD  Assistant:   Mateo SHIPLEY  Anesthesia: General and Femoral Nerve Block    Procedure: Total Left Knee Arthroplasty    Findings:  Degenerative joint disease of the left knee. Estimated Blood Loss:  150 cc    Specimens: None    Implants:   Implant Name Type Inv. Item Serial No.  Lot No. LRB No. Used Action   INSERT TIB ARTC BRNG SZ6 9MM -- TRIATHLON - [id number]  INSERT TIB ARTC BRNG SZ6 9MM -- TRIATHLON  [employer] HOW [id number] Left 1 Implanted   COMPNT FEM CR TRIATHLN 7 L PA --  - [id number]  COMPNT FEM CR TRIATHLN 7 L PA --   [employer] HOW [id number] Left 1 Implanted   PAT ASYM MTL-BK 11MM SZ A38 -- TRIATHLON - [id number]  PAT ASYM MTL-BK 11MM SZ A38 -- TRIATHLON  [employer] HOW DOTD Left 1 Implanted   BASEPLT TIB PC TRITNM SZ 6 -- TRIATHLON - [id number]   BASEPLT TIB PC TRITNM SZ 6 -- TRIATHLON   [employer] HOW [id number] Left 1 Implanted       OPERATIVE PROCEDURE IN DETAIL: The patient was taken to the operating room, placed in supine position on the operating table. After satisfactory general anesthesia was established, tourniquet was placed on the left thigh. The left leg was prepped with ChloraPrep and draped in a sterile fashion. A time-out was accomplished. Esmarch bandage was used to exsanguinate the leg. Tourniquet was inflated to 280 mmHg. Anterior midline incision was made, length of approximately 4-1/2 inches. Incision was made through the skin and subcutaneous tissue. Medial parapatellar incision was made. The patella was everted and held with towel clips. The thickness was measured at 22 mm. The preliminary cut was made using the oscillating saw.   The final preparation was not done at this time. Attention was directed to the femur. Osteophytes were removed as they were encountered. Drill hole was made in the depth of the intercondylar notch. This was followed by the intramedullary fantasma with the distal cut guide. The guide was held in place with 3 pins. Remaining jigs were removed and the distal femur was cut at this time. The femur was measured and noted to be the size 7. The multi cut femoral block was placed on the distal femur, held in place with 2 pegs and 2 pins. The , anterior, posterior, posterior chamfer, and anterior chamfer cuts were made at this time. The remaining pins and jig were removed at this time. Bone was removed using an osteotome. Curved osteotome was placed on the back of the distal femur to release any osteophytes and contractures at this time. Attention was directed to the tibia. Any remaining meniscal components were removed. The posterior and lateral retractors were placed. Care being taken to avoid excess pressure on the lateral retractor. The extramedullary tibial guide system was used. It was held in position and adjusted for alignment. The cutting guide was measured at approximately 9 mm off the high side. The cutting block was pinned in place. The remaining jigs were removed. The alignment fantasma was placed on the tibial cutting block to help with alignment. The proximal tibia was cut at this time. The bone was removed. The spacer block was used and was satisfactory in flexion and extension. The tibia was sized and noted to be the size indicated above. The femoral component was impacted into position. The tibial baseplate with the trial poly on it was placed at this time. The trial tibia and trial poly were placed with the knee in extension. The tibial baseplate was fixed with two pins. The knee was placed through a range of motion which was noted to be satisfactory in flexion and extension. Good stability was noted in both flexion and extension. Attention was directed back to the patella. The patella was again everted a maximum of 10 mm of patella was removed from the initial measurement with the measurement now being 12 mm. The patella was sized and noted to be the size indicated above. The lug holes were drilled at this time. The knee was placed in flexion. The femoral lug holes were drilled. The guide for the tibial finned punch was placed and the finned punch was use at this time. The finned punch was removed and the guide for the tibial pegs was placed. All 4 peg holes were made. Any sclerotic areas were multiply drilled. Pulse lavage irrigation was used at this time. The tibial base plate was impacted into position. The polyethylene component was placed and impacted into position. The femoral component was impacted into position. The knee was placed in extension. The patella was then placed and compressed with a clamp. The knee was checked in flexion and extension for stability in varus and valgus stress. The patella tracked well without the need for lateral release. The tourniquet was deflated. Exparel was placed in the wound edges and the periosteum. The parapatellar incision was closed using interrupted #1 Vicryl figure-of-eight sutures followed by the stratafix bidirectional #2 PDS. The knee was injected with 4mg Morphine, 30 mg toradol, and 30 cc 0.5% marcaine with epinephrine. Subcutaneous tissue was closed using 2-0 Monocryl and the skin was closed with a subcuticular 3-0 Monocryl. The Prineo system was used, followed by a Mepilex dressing. The patient tolerated the procedure well, was awakened from anesthesia, transferred onto the recovery room bed and taken to recovery room in stable condition.      Esperanza Miranda MD 4/3/2017 5:38 PM

## 2017-04-03 NOTE — ANESTHESIA POSTPROCEDURE EVALUATION
Post-Anesthesia Evaluation and Assessment    Cardiovascular Function/Vital Signs  Visit Vitals    /62    Pulse 71    Temp 36.7 °C (98 °F)    Resp 18    Ht 5' 10.5\" (1.791 m)    Wt 89.6 kg (197 lb 7 oz)    SpO2 98%    BMI 27.93 kg/m2       Patient is status post Procedure(s):  LEFT TOTAL KNEE REPLACEMENT. Nausea/Vomiting: Controlled. Postoperative hydration reviewed and adequate. Pain:  Pain Scale 1: FLACC (04/03/17 1110)  Pain Intensity 1: 0 (04/03/17 1110)   Managed. Neurological Status:   Neuro (WDL): Within Defined Limits (04/03/17 0623)   At baseline. Mental Status and Level of Consciousness: Arousable. Pulmonary Status:   O2 Device: Nasal cannula (04/03/17 1050)   Adequate oxygenation and airway patent. Complications related to anesthesia: None    Post-anesthesia assessment completed. No concerns. Patient has met all discharge requirements.     Signed By: Mikaela Rasmussen MD    April 3, 2017

## 2017-04-03 NOTE — IP AVS SNAPSHOT
[facility] Screen 
 
 
 [address] [address] 
[phone] Patient: Kelsie Kilgore MRN: [id number] DIZ:[birth year] You are allergic to the following No active allergies Recent Documentation Height Weight BMI Smoking Status 1.791 m 89.6 kg 27.93 kg/m2 Never Smoker Emergency Contacts Name Discharge Info Relation Home Work Mobile Jayjay Joseph DISCHARGE CAREGIVER [3] Spouse [3] [phone] About your hospitalization You were admitted on:  April 3, 2017 You last received care in the:  [facility] [address] You were discharged on:  April 4, 2017 Unit phone number:  [phone] Why you were hospitalized Your primary diagnosis was:  Not on File Providers Seen During Your Hospitalizations Provider Role Specialty Primary office phone Jacquetta Schirmer, MD Attending Provider Orthopedic Surgery [phone] Your Primary Care Physician (PCP) Primary Care Physician Office Phone Office Fax [address] [phone] Follow-up Information Follow up With Details Comments Contact Info Jacquetta Schirmer, MD On 4/19/2017 Follow up appointment @ 7:30am [address] [place] [address] 
[phone] Jerson Palacios MD   [address] [place] [address] [id number] [phone] Current Discharge Medication List  
  
START taking these medications Dose & Instructions Dispensing Information Comments Morning Noon Evening Bedtime  
 oxyCODONE-acetaminophen 5-325 mg per tablet Commonly known as:  PERCOCET Your last dose was: Your next dose is:    
   
   
 Dose:  1-2 Tab Take 1-2 Tabs by mouth every four (4) hours as needed for Pain. Max Daily Amount: 12 Tabs. Quantity:  60 Tab Refills:  0 CONTINUE these medications which have CHANGED Dose & Instructions Dispensing Information Comments Morning Noon Evening Bedtime  
 aspirin delayed-release 81 mg tablet What changed:  when to take this Your last dose was: Your next dose is:    
   
   
 Dose:  81 mg Take 1 Tab by mouth two (2) times a day. Quantity:  60 Tab Refills:  0 CONTINUE these medications which have NOT CHANGED Dose & Instructions Dispensing Information Comments Morning Noon Evening Bedtime ALLEGRA-D 24 HOUR 180-240 mg per tablet Generic drug:  fexofenadine-pseudoephedrine Your last dose was: Your next dose is:    
   
   
 Dose:  1 Tab Take 1 Tab by mouth daily. Refills:  0  
     
   
   
   
  
 atorvastatin 40 mg tablet Commonly known as:  LIPITOR Your last dose was: Your next dose is:    
   
   
 Dose:  20 mg Take 20 mg by mouth daily. Refills:  0  
     
   
   
   
  
 citalopram 20 mg tablet Commonly known as:  Blondedwige Aldana Your last dose was: Your next dose is:    
   
   
 Dose:  20 mg Take 20 mg by mouth daily. Refills:  0  
     
   
   
   
  
 ferrous sulfate 325 mg (65 mg iron) tablet Your last dose was: Your next dose is: Take  by mouth Daily (before breakfast). Refills:  0  
     
   
   
   
  
 losartan 25 mg tablet Commonly known as:  COZAAR Your last dose was: Your next dose is: Take  by mouth daily. Refills:  0  
     
   
   
   
  
 SYNTHROID 75 mcg tablet Generic drug:  levothyroxine Your last dose was: Your next dose is:    
   
   
 Dose:  75 mcg Take 75 mcg by mouth Daily (before breakfast). Refills:  0  
     
   
   
   
  
 tamsulosin 0.4 mg capsule Commonly known as:  FLOMAX Your last dose was: Your next dose is:    
   
   
 Dose:  0.4 mg Take 0.4 mg by mouth daily. Refills:  0  
     
   
   
   
  
 VITAMIN C 500 mg tablet Generic drug:  ascorbic acid (vitamin C) Your last dose was: Your next dose is:    
   
   
 Dose:  500 mg Take 500 mg by mouth daily. Refills:  0  
     
   
   
   
  
 VITAMIN D3 2,000 unit Tab Generic drug:  cholecalciferol (vitamin D3) Your last dose was: Your next dose is: Take  by mouth. Refills:  0 STOP taking these medications   
 fish oil-dha-epa 1,200-144-216 mg Cap  
   
  
 glucosamine-chondroitin 750-600 mg Tab Where to Get Your Medications Information on where to get these meds will be given to you by the nurse or doctor. ! Ask your nurse or doctor about these medications  
  aspirin delayed-release 81 mg tablet  
 oxyCODONE-acetaminophen 5-325 mg per tablet Discharge Instructions Total Knee Arthroplasty Discharge Instructions Dr. Jacquetta Schirmer Please take the time to review the following instructions before you leave the hospital and use them as guidelines during your recovery from surgery. If you have any questions you may contact my office at [phone]. Wound Care/Dressing Changes: You may change your dressing as needed. Beginning the 2 days after you are discharged from the hospital you should change your dressing daily. A big, bulky dressing isn't necessary as long as there isn't any drainage from the incisions. You can put a band-aid or Mepilex dressing over the incision and wear MOHAN hose as needed for comfort and swelling. No dressing is necessary if there is no drainage. It isn't necessary to apply antibiotic ointment to your incisions. Prineo tape will peel off in approximately 2-6 weeks. It does not need to be removed prior to that. When it begins to peel off you can cut the edges away with scissors. Showering/Bathing: You may shower 2 days after surgery.  Your dressing may be removed for showering. You may get your incisions wet in the shower. Don't vigorously scrub the area where your incisions are. Apply a clean, dry dressing after drying off the area of your incisions. Don't take a tub bath, get in a swimming pool or Jacuzzi until the incisions are completely healed, which is about 14 days. Do not soak your incisions under water. Weight Bearing Status/Activity: 
       
You may walk as tolerated and perform your normal daily activities. Use a walker or a  
cane only if you need them. Continue CPM use three times daily for 2 hrs at a time,  
increase flexion by 10 degrees daily. You should strive to achieve full range of motion in  
your knee as tolerated. We would like for you to return to your normal activities as soon  
as possible. Ice/Elevation: 
 
Continue ice and elevation as needed for pain and swelling. Diet: 
 
Resume your prehospital diet. If you have excessive nausea or vomitting call your doctor's office. Medications: 
 
 
1. You will be given a prescription for pain medications when you are discharged from the hospital.  Take the medication as needed according to the directions on the prescription bottle. Possible side effects of the medication include dizziness, headache, nausea, vomiting, constipation and urinary retention. If you experience any of these side effects call the office so that we can assist you in relieving them. Discontinue the use of the pain medication if you develop itching, rash, shortness of breath or difficulties swallowing. If these symptoms become severe or aren't relieved by discontinuing the medication you should seek immediate medical attention. Refills of pain medication are authorized during office hours only. (8AM - 5PM Mon thru Fri) 2. If you were prescribed Percocet/oxycodone or Dilaudid/hydromorphone you must have a written prescription. These medications legally CANNOT be called in to a pharmacy. 3. Do not take Tylenol in addition to your pain medication as most of the pain medication already contains Tylenol. Do not exceed 4000 mg of Tylenol per day. Ex:  (hydrocodon 5/500mg = 500 mg of Tylenol) 4. You may resume the medication you were taking prior to your surgery. Pain medication may change the effects of any antidepressant medication. If you have any questions about possible interactions between your regular medications and the pain medication you should consult the physician who prescribes your regular medications. Stool Softeners:   
Pain medications can cause constipation. Stool softeners, warm prune juice and increasing your water and fiber intake can help prevent constipation. Do not take laxatives. Blood Thinner: You will be sent home on 81mg aspirin to take two times a day for 30 days in order to prevent blood clots. Home Health: 
Begin In-Home Physical Therapy; 3 times a week to work on gait training, range of motion, strengthening, and weight bearing exercises as tolerable. Home health has been arranged for skilled nursing visits and physical therapy. If no one from the agency calls you on the day after you arrive home, please contact them at the number provided at discharge. Physical Therapy for gait training, joint range of motion and strengthening. Continue to use the CPM Machine from 0-60 degrees, increasing 10 degrees daily as tolerable. Patient may use the CPM Machine for 2 hour sessions, 3 times daily (alternating legs, if bilateral). Patient may continue to use the CPM Machine daily until the required date of return established by the patient's insurance. Follow Up Appointment:  
 
Please call [phone] for a follow appointment with Dr. Marcelo Toure in 10-14 days from the time of your surgery. Please let our office know you are scheduling a post-op appointment. Signs and Symptoms to be Aware of: If any of the following signs and symptoms occur, you should contact Dr. Fadia Van office. Please be advised if a problem arises which you feel requires immediate medical attention or you are unable to contact Dr. Fadia Van office you should seek immediate medical attention at the emergency department or other health care facility you have access to. Signs and symptoms to watch for include: 1. A sudden increase in swelling and l or redness or warmth at the area your surgery was performed which isn't relieved by rest, ice and elevation. 2 Oral temperature greater than 101.5 degrees for 12 hours or more which isn't relieved by an increase in fluid intake and taking two Tylenol every 4-6 hours. 3 Excessive drainage from your incisions, or drainage which hasn't stopped by 72 hours after your surgery despite applying a compressive dressing, ice and elevation. 4 Calfpain, tenderness, redness or swelling which isn't relieved with rest and elevation. 5 Fever, chills, shortness ofbreath, chest pain, nausea, vomiting or other signs and symptoms which are of concern to you. Other Instructions: 
 
 
Lab Results Component Value Date/Time Hemoglobin A1c 6.0 03/09/2017 08:15 AM  
 
 
This lab test reflects that your blood sugar has been slightly elevated over the past 3 months and should be evaluated by your primary care provider. An A1C of 5.7-6.4% meets the criteria for pre-diabetes; an A1C of 6.5% or higher meets the criteria for diabetes. This lab test reflects that your blood sugar averaged 125 mg/dL  over the past 3 months. It is important to follow up with your provider on a routine basis to continue to evaluate your blood sugar and discuss any necessary changes in treatment. Discharge Orders None [facility] Announcement We are excited to announce that we are making your provider's discharge notes available to you in [employer].   You will see these notes when they are completed and signed by the physician that discharged you from your recent hospital stay. If you have any questions or concerns about any information you see in [employer], please call the Health Information Department where you were seen or reach out to your Primary Care Provider for more information about your plan of care. Introducing [facility] SERVICES! Jt Hernandes introduces [employer] patient portal. Now you can access parts of your medical record, email your doctor's office, and request medication refills online. 1. In your internet browser, go to [email or web address] 2. Click on the First Time User? Click Here link in the Sign In box. You will see the New Member Sign Up page. 3. Enter your [employer] Access Code exactly as it appears below. You will not need to use this code after youve completed the sign-up process. If you do not sign up before the expiration date, you must request a new code. · [employer] Access Code: [email or web address] Expires: 6/6/2017  2:47 PM 
 
4. Enter the last four digits of your Social Security Number (xxxx) and Date of Birth (mm/dd/yyyy) as indicated and click Submit. You will be taken to the next sign-up page. 5. Create a [employer] ID. This will be your [employer] login ID and cannot be changed, so think of one that is secure and easy to remember. 6. Create a [employer] password. You can change your password at any time. 7. Enter your Password Reset Question and Answer. This can be used at a later time if you forget your password. 8. Enter your e-mail address. You will receive e-mail notification when new information is available in [address] 9. Click Sign Up. You can now view and download portions of your medical record. 10. Click the Download Summary menu link to download a portable copy of your medical information.  
 
If you have questions, please visit the Frequently Asked Questions section of the [employer]. Remember, MyChart is NOT to be used for urgent needs. For medical emergencies, dial 911. Now available from your iPhone and Android! General Information Please provide this summary of care documentation to your next provider. Patient Signature:  ____________________________________________________________ Date:  ____________________________________________________________  
  
Alfred Daniel Provider Signature:  ____________________________________________________________ Date:  ____________________________________________________________

## 2017-04-03 NOTE — PERIOP NOTES
Received patient from [address] [place] [address] and anesthesia provider. Patient identification, review of procedure and intraoperative course completed.

## 2017-04-03 NOTE — ANESTHESIA PREPROCEDURE EVALUATION
Anesthetic History   No history of anesthetic complications            Review of Systems / Medical History  Patient summary reviewed, nursing notes reviewed and pertinent labs reviewed    Pulmonary  Within defined limits                 Neuro/Psych   Within defined limits           Cardiovascular    Hypertension              Exercise tolerance: >4 METS     GI/Hepatic/Renal     GERD           Endo/Other      Hypothyroidism  Arthritis     Other Findings              Physical Exam    Airway  Mallampati: III  TM Distance: 4 - 6 cm  Neck ROM: decreased range of motion   Mouth opening: Diminished (comment)     Cardiovascular  Regular rate and rhythm,  S1 and S2 normal,  no murmur, click, rub, or gallop    Rate: normal         Dental    Dentition: Caps/crowns     Pulmonary  Breath sounds clear to auscultation               Abdominal  GI exam deferred       Other Findings            Anesthetic Plan    ASA: 2  Anesthesia type: general      Post-op pain plan if not by surgeon: peripheral nerve block single    Induction: Intravenous  Anesthetic plan and risks discussed with: Patient and Spouse

## 2017-04-03 NOTE — H&P
[address] [facility] [address] Medicine  History and Physical Exam    Patient: Binh Rivera MRN: [id number]  SSN: [id number]    YOB: 1940  Age: 78 y.o. Sex: male      Subjective:      Chief Complaint: left knee pain    History of Present Illness:  Patient complains of left knee pain and difficulty ambulating. Past Medical History:   Diagnosis Date    Arthritis     Cancer ([address]     skin    GERD (gastroesophageal reflux disease)     Hypercholesteremia     Hypertension 1999    Prostatitis     Thyroid disease      Past Surgical History:   Procedure Laterality Date    HX HERNIA REPAIR      x2    HX KNEE ARTHROSCOPY Left     x2    HX ROTATOR CUFF REPAIR Left      Social History     Occupational History    Not on file. Social History Main Topics    Smoking status: Never Smoker    Smokeless tobacco: Never Used    Alcohol use No    Drug use: No    Sexual activity: Not on file     Family history: \"Cancer,\" \"heart disease\"    Prior to Admission medications    Medication Sig Start Date End Date Taking? Authorizing Provider   levothyroxine (SYNTHROID) 75 mcg tablet Take 75 mcg by mouth Daily (before breakfast). Historical Provider   citalopram (CELEXA) 20 mg tablet Take 20 mg by mouth daily. Historical Provider   tamsulosin (FLOMAX) 0.4 mg capsule Take 0.4 mg by mouth daily. Historical Provider   cholecalciferol, vitamin D3, (VITAMIN D3) 2,000 unit tab Take  by mouth. Historical Provider   fexofenadine-pseudoephedrine (ALLEGRA-D 24 HOUR) 180-240 mg per tablet Take 1 Tab by mouth daily. Historical Provider   aspirin delayed-release 81 mg tablet Take 81 mg by mouth daily. Historical Provider   fish oil-dha-epa 1,200-144-216 mg cap Take  by mouth two (2) times a day. Historical Provider   ascorbic acid, vitamin C, (VITAMIN C) 500 mg tablet Take 500 mg by mouth daily.     Historical Provider   GLUCOSAMINE HCL/CHONDR ANDERSON A NA (GLUCOSAMINE-CHONDROITIN) 750-600 mg tab Take 2 Tabs by mouth daily. Historical Provider   ferrous sulfate 325 mg (65 mg iron) tablet Take  by mouth Daily (before breakfast). Rylee Terry MD   losartan (COZAAR) 25 mg tablet Take  by mouth daily. Rylee Terry MD   atorvastatin (LIPITOR) 40 mg tablet Take 20 mg by mouth daily. Rylee Terry MD       Allergies: No Known Allergies     Review of Systems:  A comprehensive review of systems was negative except for that written in the History of Present Illness. Objective:       Physical Exam:  HEENT: Normocephalic, atraumatic  Lungs:  Clear to auscultation  Heart:   Regular rate and rhythm  Abdomen: Soft  Extremities:  Pain with range of motion of the left knee  Neurological: Grossly neurovascularly intact    Assessment:      Arthritis of the left knee. Plan:       The patient has failed previous efforts of conservative management to include non-steroidal anti-inflammatory medications, cortisone injections and viscosupplementation. Due to the fact that conservative efforts failed, the patient became a candidate for surgical intervention. Proceed with scheduled left total knee arthroplasty. The various methods of treatment have been discussed with the patient and family. After consideration of risks, benefits, and other options for treatment, the patient has consented to surgical interventions. Questions were answered and preoperative teaching was done by Dr. Bernie San.      Signed By: Lorna Bravo PA-C     April 2, 2017

## 2017-04-03 NOTE — BRIEF OP NOTE
BRIEF OPERATIVE NOTE    Date of Procedure: 4/3/2017   Preoperative Diagnosis: OSTEOARTHRITIS LEFT KNEE  Postoperative Diagnosis: OSTEOARTHRITIS LEFT KNEE    Procedure(s):  LEFT TOTAL KNEE REPLACEMENT  Surgeon(s) and Role:     * Baldo Sandhoff, MD - Primary          Assist: Tamika Moran PA-C  Surgical Staff:  Circ-1: Kaitlin Strauss RN  Circ-Relief: Jesusita Castillo RN  Scrub RN-1: Evie Reyes RN  Surg Asst-1: Elsie Reilly  Float Staff: Vanessa Dove RN  Event Time In   Incision Start 0825   Incision Close      Anesthesia: General   Estimated Blood Loss: 150mL  Specimens: * No specimens in log *   Findings: Severe DJD   Complications: None  Implants:   Implant Name Type Inv.  Item Serial No.  Lot No. LRB No. Used Action   INSERT TIB ARTC BRNG SZ6 9MM -- TRIATHLON - [id number]  INSERT TIB ARTC BRNG SZ6 9MM -- TRIATHLON  [employer] HOW [id number] Left 1 Implanted   COMPNT FEM CR TRIATHLN 7 L PA --  - [id number]  COMPNT FEM CR TRIATHLN 7 L PA --   [employer] [facility] [id number] Left 1 Implanted   PAT ASYM MTL-BK 11MM SZ A38 -- TRIATHLON - [id number]  PAT ASYM MTL-BK 11MM SZ A38 -- TRIATHLON  [employer] HOW DOTD Left 1 Implanted   BASEPLT TIB PC TRITNM SZ 6 -- TRIATHLON - [id number]   BASEPLT TIB PC TRITNM SZ 6 -- TRIATHLON   WAUKES[employer] CT[employer] MENTAL [facility] [employer] HOW [id number] Left 1 Implanted

## 2017-04-03 NOTE — PERIOP NOTES
TRANSFER - OUT REPORT:    Verbal report given to GLORY Cordova(name) on Nikko Joseph  being transferred to (unit) for routine post - op       Report consisted of patients Situation, Background, Assessment and   Recommendations(SBAR). Information from the following report(s) SBAR, Kardex, OR Summary, Procedure Summary, Intake/Output and MAR was reviewed with the receiving nurse. Lines:   Peripheral IV 04/03/17 Left Hand (Active)   Site Assessment Clean, dry, & intact 4/3/2017 10:52 AM   Phlebitis Assessment 0 4/3/2017 10:52 AM   Infiltration Assessment 0 4/3/2017 10:52 AM   Dressing Status Clean, dry, & intact 4/3/2017 10:52 AM   Dressing Type Transparent;Tape 4/3/2017 10:52 AM   Hub Color/Line Status Infusing;Green 4/3/2017 10:52 AM        Opportunity for questions and clarification was provided.       Patient transported with:   Registered Nurse  Tech

## 2017-04-03 NOTE — PROGRESS NOTES
Problem: Mobility Impaired (Adult and Pediatric)  Goal: *Acute Goals and Plan of Care (Insert Text)  In 1-7 days pt will be able to perform:  ST. Bed mobility: Rolling L to R to L modified independent for positioning. 2. Supine to sit to supine S with HR for meals. 3. Sit to stand to sit S with RW in prep for ambulation. LT. Gait: Ambulate >150ft S with RW, WBAT, for home/community mobility. 2. Stair Negotiation: Ascend/descend >3 steps CGA with HR for home entry. 3. Activity tolerance: Tolerate up in chair 1-2 hours for ADLs. 4. Patient/Family Education: Patient/family to be independent with HEP for follow-up care and safe discharge. PHYSICAL THERAPY EVALUATION     Patient: Shasha Szymanski (67 y.o. male)  Date: 4/3/2017  Primary Diagnosis: OSTEOARTHRITIS LEFT KNEE  Osteoarthritis of left knee  Procedure(s) (LRB):  LEFT TOTAL KNEE REPLACEMENT (Left) Day of Surgery   Precautions:   Fall, WBAT      ASSESSMENT :  Based on the objective data described below, the patient presents with decreased functional mobility and independence in regard to bed mobility, transfers, gt quality and tolerance, L knee AROM, L knee strength, pain, stair negotiation and safety due to recent L TKA surgery. Pt rating pain in L knee 4/10 on numerical pain scale. Pt able to participate in gt training w/ RW, WBAT, GB and CGA in hallway w/ antalgic pattern. Pt c/o feeling a little dizzy. Pt returned to sitting EOB to eat meal w/ all needs within reach . Nurse [address] [place] aware and wife present. [facility]. Pt will need RW for personal use. Patient will benefit from skilled intervention to address the above impairments.   Patients rehabilitation potential is considered to be Good  Factors which may influence rehabilitation potential include:   [ ]         None noted  [ ]         Mental ability/status  [ ]         Medical condition  [ ]         Home/family situation and support systems  [ ] Safety awareness  [X]         Pain tolerance/management  [ ]         Other:        PLAN :  Recommendations and Planned Interventions:  [X]           Bed Mobility Training             [ ]    Neuromuscular Re-Education  [X]           Transfer Training                   [ ]    Orthotic/Prosthetic Training  [X]           Gait Training                          [ ]    Modalities  [X]           Therapeutic Exercises          [ ]    Edema Management/Control  [X]           Therapeutic Activities            [X]    Patient and Family Training/Education  [ ]           Other (comment):     Frequency/Duration: Patient will be followed by physical therapy twice daily to address goals. Discharge Recommendations: Home Health  Further Equipment Recommendations for Discharge: rolling walker       SUBJECTIVE:   Patient stated I am doing okay now that I got some pain medicine.       OBJECTIVE DATA SUMMARY:       Past Medical History:   Diagnosis Date    Arthritis      [address] ([facility] [address]       skin    GERD (gastroesophageal reflux disease)      Hypercholesteremia      Hypertension 1999    Prostatitis      Thyroid disease       Past Surgical History:   Procedure Laterality Date    HX HERNIA REPAIR         x2    HX KNEE ARTHROSCOPY Left       x2    HX ROTATOR CUFF REPAIR Left       Barriers to Learning/Limitations: None  Compensate with: visual, verbal, tactile, kinesthetic cues/model  Prior Level of Function/Home Situation:   Home Situation  Home Environment: Private residence  # Steps to Enter: 4  Rails to Enter: No  One/Two Story Residence: One story  Living Alone: Yes  Support Systems: Spouse/Significant Other/Partner  Patient Expects to be Discharged to[de-identified] Private residence  Current DME Used/Available at Home: guadalupe Rhodes  Critical Behavior:  Neurologic State: Alert; Appropriate for age  Orientation Level: Oriented X4  Cognition: Appropriate decision making; Appropriate for age attention/concentration; Appropriate safety awareness; Follows commands  Safety/Judgement: Awareness of environment  Psychosocial  Patient Behaviors: Calm; Cooperative  Family  Behaviors: Supportive;Calm  Purposeful Interaction: Yes  Pt Identified Daily Priority: Clinical issues (comment)  Caring Interventions: Reassure  Reassure: Therapeutic listening  Skin Condition/Temp: Dry;Warm  Family  Behaviors: Supportive;Calm  Skin Integrity: Incision (comment) (L knee)  Skin Integumentary  Skin Color: Appropriate for ethnicity  Skin Condition/Temp: Dry;Warm  Skin Integrity: Incision (comment) (L knee)  Turgor: Non-tenting  Hair Growth: Present  Varicosities: Absent  Strength:    Strength: Generally decreased, functional  Tone & Sensation:   Tone: Normal  Range Of Motion:  AROM: Generally decreased, functional  Functional Mobility:  Bed Mobility:  Supine to Sit: Contact guard assistance (vc)  Scooting: Contact guard assistance (vc)  Transfers:  Sit to Stand: Minimum assistance;Contact guard assistance (vc)  Stand to Sit: Contact guard assistance (vc)  Balance:   Sitting: Intact  Standing: Intact; With support  Ambulation/Gait Training:  Distance (ft): 46 Feet (ft)  Assistive Device: Walker, rolling;Gait belt  Ambulation - Level of Assistance: Contact guard assistance (vc)  Gait Abnormalities: Antalgic;Decreased step clearance; Step to gait  Left Side Weight Bearing: As tolerated  Base of Support: Shift to right  Stance: Left decreased  Speed/Sheron: Slow  Step Length: Left shortened;Right shortened  Swing Pattern: Left asymmetrical;Right asymmetrical  Interventions: Safety awareness training;Verbal cues  Therapeutic Exercises:   HEP written copy issued to pt per MD protocol. Pain:  Pain Scale 1: Numeric (0 - 10)  Pain Intensity 1: 4  Pain Location 1: Knee  Pain Orientation 1: Left  Pain Description 1: Aching  Pain Intervention(s) 1: Medication (see MAR)  Activity Tolerance:   Fair   Please refer to the flowsheet for vital signs taken during this treatment.   After treatment:   [X]         Patient left in no apparent distress sitting up EOB  [ ]         Patient left in no apparent distress in bed  [X]         Call bell left within reach  [X]         Nursing notified  [ ]         Caregiver present  [ ]         Bed alarm activated      COMMUNICATION/EDUCATION:   [X]         Fall prevention education was provided and the patient/caregiver indicated understanding. [X]         Patient/family have participated as able in goal setting and plan of care. [X]         Patient/family agree to work toward stated goals and plan of care. [ ]         Patient understands intent and goals of therapy, but is neutral about his/her participation. [ ]         Patient is unable to participate in goal setting and plan of care.      Thank you for this referral.  Nicole Stockton, PT   Time Calculation: 32 mins  Eval Complexity: History: LOW Complexity : Zero comorbidities / personal factors that will impact the outcome / POCExam:LOW Complexity : 1-2 Standardized tests and measures addressing body structure, function, activity limitation and / or participation in recreation  Presentation: LOW Complexity : Stable, uncomplicated  Clinical Decision Making:Low Complexity amb >30' RW Overall Complexity:LOW

## 2017-04-03 NOTE — ROUTINE PROCESS
TRANSFER - IN REPORT:    Verbal report received from Joey Croft RN(name) on Nikko Joseph  being received from PACU(unit) for routine progression of care      Report consisted of patients Situation, Background, Assessment and   Recommendations(SBAR). Information from the following report(s) SBAR, Kardex, Intake/Output and MAR was reviewed with the receiving nurse. Opportunity for questions and clarification was provided. Assessment completed upon patients arrival to unit and care assumed.

## 2017-04-03 NOTE — ANESTHESIA PROCEDURE NOTES
Peripheral Block    Start time: 4/3/2017 7:41 AM  End time: 4/3/2017 7:46 AM  Performed by: Angel Day by: Rayna Villarreal       Pre-procedure: Indications: at surgeon's request and post-op pain management    Preanesthetic Checklist: patient identified, risks and benefits discussed, site marked, timeout performed, anesthesia consent given and patient being monitored    Timeout Time: 07:41          Block Type:   Block Type: Adductor canal  Laterality:  Left  Monitoring:  Standard ASA monitoring, responsive to questions, continuous pulse ox, oxygen, frequent vital sign checks and heart rate  Injection Technique:  Single shot  Procedures: ultrasound guided    Patient Position: supine  Prep: chlorhexidine    Location:  Mid thigh  Needle Type:  Stimuplex  Needle Gauge:  21 G  Needle Localization:  Ultrasound guidance  Medication Injected:  0.5%  ropivacaine  Volume (mL):  20  Add'l Medication Injected:  1.5%  mepivacaine  Volume (mL):  10    Assessment:  Number of attempts:  1  Injection Assessment:  Incremental injection every 5 mL, negative aspiration for CSF, no paresthesia, ultrasound image on chart, local visualized surrounding nerve on ultrasound, negative aspiration for blood and no intravascular symptoms  Patient tolerance:  Patient tolerated the procedure well with no immediate complications  Procedure and alternatives explained. Risks explained including bleeding, infection, nerve injury, failed block. Procedure explained as elective. Pt understands and desires to proceed. Patient able to straight leg raise  left leg after block. No sensory or motor block.

## 2017-04-03 NOTE — IP AVS SNAPSHOT
Current Discharge Medication List  
  
START taking these medications Dose & Instructions Dispensing Information Comments Morning Noon Evening Bedtime  
 oxyCODONE-acetaminophen 5-325 mg per tablet Commonly known as:  PERCOCET Your last dose was: Your next dose is:    
   
   
 Dose:  1-2 Tab Take 1-2 Tabs by mouth every four (4) hours as needed for Pain. Max Daily Amount: 12 Tabs. Quantity:  60 Tab Refills:  0 CONTINUE these medications which have CHANGED Dose & Instructions Dispensing Information Comments Morning Noon Evening Bedtime  
 aspirin delayed-release 81 mg tablet What changed:  when to take this Your last dose was: Your next dose is:    
   
   
 Dose:  81 mg Take 1 Tab by mouth two (2) times a day. Quantity:  60 Tab Refills:  0 CONTINUE these medications which have NOT CHANGED Dose & Instructions Dispensing Information Comments Morning Noon Evening Bedtime ALLEGRA-D 24 HOUR 180-240 mg per tablet Generic drug:  fexofenadine-pseudoephedrine Your last dose was: Your next dose is:    
   
   
 Dose:  1 Tab Take 1 Tab by mouth daily. Refills:  0  
     
   
   
   
  
 atorvastatin 40 mg tablet Commonly known as:  LIPITOR Your last dose was: Your next dose is:    
   
   
 Dose:  20 mg Take 20 mg by mouth daily. Refills:  0  
     
   
   
   
  
 citalopram 20 mg tablet Commonly known as:  Kate Kenney Your last dose was: Your next dose is:    
   
   
 Dose:  20 mg Take 20 mg by mouth daily. Refills:  0  
     
   
   
   
  
 ferrous sulfate 325 mg (65 mg iron) tablet Your last dose was: Your next dose is: Take  by mouth Daily (before breakfast). Refills:  0  
     
   
   
   
  
 losartan 25 mg tablet Commonly known as:  COZAAR Your last dose was: Your next dose is: Take  by mouth daily. Refills:  0  
     
   
   
   
  
 SYNTHROID 75 mcg tablet Generic drug:  levothyroxine Your last dose was: Your next dose is:    
   
   
 Dose:  75 mcg Take 75 mcg by mouth Daily (before breakfast). Refills:  0  
     
   
   
   
  
 tamsulosin 0.4 mg capsule Commonly known as:  FLOMAX Your last dose was: Your next dose is:    
   
   
 Dose:  0.4 mg Take 0.4 mg by mouth daily. Refills:  0  
     
   
   
   
  
 VITAMIN C 500 mg tablet Generic drug:  ascorbic acid (vitamin C) Your last dose was: Your next dose is:    
   
   
 Dose:  500 mg Take 500 mg by mouth daily. Refills:  0  
     
   
   
   
  
 VITAMIN D3 2,000 unit Tab Generic drug:  cholecalciferol (vitamin D3) Your last dose was: Your next dose is: Take  by mouth. Refills:  0 STOP taking these medications   
 fish oil-dha-epa 1,200-144-216 mg Cap  
   
  
 glucosamine-chondroitin 750-600 mg Tab Where to Get Your Medications Information on where to get these meds will be given to you by the nurse or doctor. ! Ask your nurse or doctor about these medications  
  aspirin delayed-release 81 mg tablet  
 oxyCODONE-acetaminophen 5-325 mg per tablet

## 2017-04-04 VITALS
SYSTOLIC BLOOD PRESSURE: 98 MMHG | HEIGHT: 71 IN | RESPIRATION RATE: 16 BRPM | BODY MASS INDEX: 27.64 KG/M2 | WEIGHT: 197.44 LBS | DIASTOLIC BLOOD PRESSURE: 67 MMHG | OXYGEN SATURATION: 92 % | TEMPERATURE: 99.1 F | HEART RATE: 78 BPM

## 2017-04-04 LAB
ANION GAP BLD CALC-SCNC: 1 MMOL/L (ref 3–18)
BUN SERPL-MCNC: 20 MG/DL (ref 7–18)
BUN/CREAT SERPL: 19 (ref 12–20)
CALCIUM SERPL-MCNC: 8.1 MG/DL (ref 8.5–10.1)
CHLORIDE SERPL-SCNC: 102 MMOL/L (ref 100–108)
CO2 SERPL-SCNC: 31 MMOL/L (ref 21–32)
CREAT SERPL-MCNC: 1.04 MG/DL (ref 0.6–1.3)
GLUCOSE SERPL-MCNC: 113 MG/DL (ref 74–99)
HCT VFR BLD AUTO: 33 % (ref 36–48)
HGB BLD-MCNC: 11.3 G/DL (ref 13–16)
POTASSIUM SERPL-SCNC: 4.8 MMOL/L (ref 3.5–5.5)
SODIUM SERPL-SCNC: 134 MMOL/L (ref 136–145)

## 2017-04-04 PROCEDURE — 36415 COLL VENOUS BLD VENIPUNCTURE: CPT | Performed by: PHYSICIAN ASSISTANT

## 2017-04-04 PROCEDURE — 77030034849

## 2017-04-04 PROCEDURE — 77030010545

## 2017-04-04 PROCEDURE — 80048 BASIC METABOLIC PNL TOTAL CA: CPT | Performed by: PHYSICIAN ASSISTANT

## 2017-04-04 PROCEDURE — 97110 THERAPEUTIC EXERCISES: CPT

## 2017-04-04 PROCEDURE — 74011250637 HC RX REV CODE- 250/637: Performed by: PHYSICIAN ASSISTANT

## 2017-04-04 PROCEDURE — 97116 GAIT TRAINING THERAPY: CPT

## 2017-04-04 PROCEDURE — 74011250636 HC RX REV CODE- 250/636: Performed by: PHYSICIAN ASSISTANT

## 2017-04-04 PROCEDURE — 74011250637 HC RX REV CODE- 250/637: Performed by: ORTHOPAEDIC SURGERY

## 2017-04-04 PROCEDURE — 51798 US URINE CAPACITY MEASURE: CPT

## 2017-04-04 PROCEDURE — 97165 OT EVAL LOW COMPLEX 30 MIN: CPT

## 2017-04-04 PROCEDURE — 97535 SELF CARE MNGMENT TRAINING: CPT

## 2017-04-04 PROCEDURE — 85018 HEMOGLOBIN: CPT | Performed by: PHYSICIAN ASSISTANT

## 2017-04-04 RX ORDER — TAMSULOSIN HYDROCHLORIDE 0.4 MG/1
0.4 CAPSULE ORAL
Status: COMPLETED | OUTPATIENT
Start: 2017-04-04 | End: 2017-04-04

## 2017-04-04 RX ORDER — OXYCODONE AND ACETAMINOPHEN 5; 325 MG/1; MG/1
1-2 TABLET ORAL
Qty: 60 TAB | Refills: 0 | Status: SHIPPED | OUTPATIENT
Start: 2017-04-04 | End: 2018-07-17

## 2017-04-04 RX ORDER — ASPIRIN 81 MG/1
81 TABLET ORAL 2 TIMES DAILY
Qty: 60 TAB | Refills: 0 | Status: SHIPPED | OUTPATIENT
Start: 2017-04-04

## 2017-04-04 RX ADMIN — FERROUS SULFATE TAB 325 MG (65 MG ELEMENTAL FE) 325 MG: 325 (65 FE) TAB at 07:24

## 2017-04-04 RX ADMIN — OXYCODONE HYDROCHLORIDE 10 MG: 5 TABLET ORAL at 10:15

## 2017-04-04 RX ADMIN — DOCUSATE SODIUM 100 MG: 100 CAPSULE, LIQUID FILLED ORAL at 09:03

## 2017-04-04 RX ADMIN — OXYCODONE HYDROCHLORIDE 10 MG: 5 TABLET ORAL at 14:42

## 2017-04-04 RX ADMIN — ATORVASTATIN CALCIUM 20 MG: 20 TABLET, FILM COATED ORAL at 09:04

## 2017-04-04 RX ADMIN — OXYCODONE HYDROCHLORIDE 10 MG: 5 TABLET ORAL at 05:55

## 2017-04-04 RX ADMIN — CITALOPRAM HYDROBROMIDE 20 MG: 20 TABLET ORAL at 09:05

## 2017-04-04 RX ADMIN — LOSARTAN POTASSIUM 25 MG: 25 TABLET, FILM COATED ORAL at 09:04

## 2017-04-04 RX ADMIN — LEVOTHYROXINE SODIUM 75 MCG: 75 TABLET ORAL at 07:24

## 2017-04-04 RX ADMIN — ACETAMINOPHEN 1000 MG: 10 INJECTION, SOLUTION INTRAVENOUS at 05:45

## 2017-04-04 RX ADMIN — TAMSULOSIN HYDROCHLORIDE 0.4 MG: 0.4 CAPSULE ORAL at 13:50

## 2017-04-04 RX ADMIN — ASPIRIN 81 MG: 81 TABLET, COATED ORAL at 09:04

## 2017-04-04 RX ADMIN — SODIUM CHLORIDE, SODIUM LACTATE, POTASSIUM CHLORIDE, AND CALCIUM CHLORIDE 75 ML/HR: 600; 310; 30; 20 INJECTION, SOLUTION INTRAVENOUS at 05:45

## 2017-04-04 RX ADMIN — TAMSULOSIN HYDROCHLORIDE 0.4 MG: 0.4 CAPSULE ORAL at 09:03

## 2017-04-04 NOTE — PROGRESS NOTES
1721- Assumed care of patient at this time. Report received from Kyara Estrella RN. Patient in chair. Pain 3/10. Call bell left within reach. 0900- Patient placed on CPM to left knee at this time. Call bell placed within patient reach, patient informed to call if he experiences any discomfort or needs assistance. 8051 - Patient in bed at this time, CPM in place. Patient A/O x 4. Lungs clear, radial pulses present , pedal pulses present , abdomen soft and non-distended. Bowel sounds active, 18 G IV to left hand  Intact, patent and infusing. No signs of phlebitis or infiltration noted. MOHAN hose to RLE and plexis applied bilaterally. Skin warm and dry  with  ACE dressing to LLE CDI. Patient denies numbness/tingling. Pain 5/10. Bed placed in lowest position, call bell within reach. 1015- Pain 6/10. PRN Roxicodone 10 mg PO pain medication administered at this time. Patient has been educated on side effects. Side effect education sheets have been provided. 1330- Dr. Leodan Duran office contacted at this time d/t patient still unable to void sufficient amount. Patient only voided 50 ml reed urine. Patient states he has not taken flomax for 3 days. Dr. Leodan Duran ordered additional dose of flomax. 1445- Pain 8/10. PRN Roxicodone 10 mg PO pain medication administered at this time. Patient has been educated on side effects. Side effect education sheets have been provided. 65- Spoke to Dr. Fanta West, urologist, stated to insert hernández catheter and ok to discharge patient, patient to f/u Monday with office    1700 Hernández catheter inserted at this time, catheter secured to patients right thigh, patient tolerated well, 400 ml reed colored urine drained from bladder. Leg drainage bag applied. 1725- This writer has reviewed discharge instructions with patient at this time. Patient has verbalized understanding. Patient was provided with care notes to include side effects of RX's. IV removed, patient tolerated well.   Arm bands removed and shredded. AVS were reviewed with Alexis Smith RN.

## 2017-04-04 NOTE — ROUTINE PROCESS
Bedside and Verbal shift change report given to BRIANNA Winn RN (oncoming nurse) by KATHERYN RN (offgoing nurse). Report included the following information SBAR, Kardex, Intake/Output and MAR.

## 2017-04-04 NOTE — PROGRESS NOTES
Speech Therapy Screening:  Services are not indicated at this time. [facility] based on results obtained during the nursing admission assessment. The patients chart was reviewed and the patient is not appropriate for a skilled therapy evaluation at this time. Please consult speech therapy if any therapy needs arise. Thank you.     Mel Maynard, SLP

## 2017-04-04 NOTE — PROGRESS NOTES
2342-Awake, resting quietly in bed. Stable. Denies numbness and tingling to extremities. Ace wrap, left lower extremity, thigh to toes, intact, no drainage. Ice packs in place. Positive dorsalis pedis pulse, sensation, warm, capillary refill, movement, dorsi flexion, plantar flexion, and denies calf pain to left lower extremity. Incentive spirometer in use, return demonstration done. Assessment complete. Call light and personal items within reach. 0325-No change from initial assessment. Stable. Denies need for pain medication. 0657-Straight cath done. 0708-Up in chair at bedside. Stable. Shift Summary:  Difficulty voiding/emptying bladder during shift. Straight cath x 1. Stable.

## 2017-04-04 NOTE — PROGRESS NOTES
Problem: Self Care Deficits Care Plan (Adult)  Goal: *Acute Goals and Plan of Care (Insert Text)  Outcome: Resolved/Met Date Met:  04/04/17  OCCUPATIONAL THERAPY EVALUATION/DISCHARGE     Patient: Lala Dior (12 y.o. male)  Date: 4/4/2017  Primary Diagnosis: OSTEOARTHRITIS LEFT KNEE  Osteoarthritis of left knee  Procedure(s) (LRB):  LEFT TOTAL KNEE REPLACEMENT (Left) 1 Day Post-Op   Precautions:   Fall, WBAT      ASSESSMENT AND RECOMMENDATIONS:  Based on the objective data described below, the patient presents with ability to perform ADL tasks at baseline level. Pt seated in chair upon entering, agreeable to therapy. Pt demonstrated ability to don/doff socks with supervision. Pt completed sit to stand transfer, functional/bathroom mobility, toilet transfer, and simulated grooming while standing at sink with use of RW with supervision. Pt returned to supine in bed with supervision. Pt left supine in bed with CPM in place. Pt with no further OT/ADL concerns at this time. Skilled occupational therapy is not indicated at this time. Education: Role of OT in acute care, plan of care, home safety, safety using RW during bathroom ADLs/mobility     Discharge Recommendations: Home Health  Further Equipment Recommendations for Discharge: rolling walker        SUBJECTIVE:   Patient stated .      OBJECTIVE DATA SUMMARY:       Past Medical History:   Diagnosis Date    Arthritis      Cancer ([address]       skin    GERD (gastroesophageal reflux disease)      Hypercholesteremia      Hypertension 1999    Prostatitis      Thyroid disease       Past Surgical History:   Procedure Laterality Date    HX HERNIA REPAIR         x2    HX KNEE ARTHROSCOPY Left       x2    HX ROTATOR CUFF REPAIR Left       Barriers to Learning/Limitations: None  Compensate with: visual, verbal, tactile, kinesthetic cues/model  GCODES(GO):Self Care  Current  CI= 1-19%   Goal  CI= 1-19%   D/C  CI= 1-19%.   The severity rating is based on the Other modified barthel index  Prior Level of Function/Home Situation: I with ADLs  Home Situation  Home Environment: Private residence  # Steps to Enter: 4  Rails to Enter: No  One/Two Story Residence: One story  Living Alone: No  Support Systems: Spouse/Significant Other/Partner  Patient Expects to be Discharged to[de-identified] Private residence  Current DME Used/Available at Home: Cane, straight  Tub or Shower Type: Tub/Shower combination     Cognitive/Behavioral Status:  Neurologic State: Alert  Orientation Level: Oriented X4  Cognition: Follows commands  Safety/Judgement: Fall prevention  Coordination:  Coordination: Within functional limits  Fine Motor Skills-Upper: Left Intact; Right Intact    Gross Motor Skills-Upper: Left Intact; Right Intact  Balance:  Sitting: Intact  Standing: Intact; With support  Strength:  Strength: Within functional limits     Tone & Sensation:  Tone: Normal     Range of Motion:  AROM: Within functional limits     Functional Mobility and Transfers for ADLs:  Bed Mobility:  Sit to Supine: Supervision     Transfers:  Sit to Stand: Supervision              Toilet Transfer : Supervision              Bathroom Mobility: Supervision/set up  ADL Assessment:  Lower Body Dressing: Supervision     Toileting: Supervision     ADL Intervention:  Lower Body Dressing Assistance  Dressing Assistance: Supervision/set up  Socks: Supervision/set-up  Leg Crossed Method Used: No  Position Performed: Bending forward method;Seated in chair     Cognitive Retraining  Safety/Judgement: Fall prevention     Pain:  Pre-treatment: 5  Post-treatment: 5  Activity Tolerance:   good  Please refer to the flowsheet for vital signs taken during this treatment.   After treatment:   [ ]  Patient left in no apparent distress sitting up in chair  [X]  Patient left in no apparent distress in bed  [X]  Call bell left within reach  [X]  Nursing notified  [ ]  Caregiver present  [ ]  Bed alarm activated COMMUNICATION/EDUCATION:   Communication/Collaboration:  [X]      Home safety education was provided and the patient/caregiver indicated understanding. [X]      Patient/family have participated as able and agree with findings and recommendations. [ ]      Patient is unable to participate in plan of care at this time.      Gardenia Blas, MS OTR/L  Time Calculation: 26 mins

## 2017-04-04 NOTE — ROUTINE PROCESS
Bedside and Verbal shift change report given to Imelda Guevara RN (oncoming nurse) by Colleen George RN (offgoing nurse). Report included the following information SBAR and Kardex.

## 2017-04-04 NOTE — DISCHARGE SUMMARY
Patient: Kelsie Kilgore               Sex: male         MRN: [id number]       YOB: 1940      Age:  78 y.o.        LOS:  LOS: 1 day                DOA: 4/3/2017    Discharge Date: 4/4/2017    Admission Diagnoses: OSTEOARTHRITIS LEFT KNEE  Osteoarthritis of left knee    Discharge Diagnoses:    Problem List as of 4/4/2017  Date Reviewed: 4/4/2017    None          This is a 78 y.o. male with a  history of ongoing knee pain secondary to degenerative joint disease. The patient has failed to respond to conservative care. The option of a total knee arthroplasty was discussed with the patient. Risks and benefits of the procedure were explained to the patient as well as other treatment options and possible surgical outcomes. The patient acknowledged and consent was obtained. The patient was therefore scheduled to undergo a left total knee arthroplasty with Dr. Jacquetta Schirmer. The patient was taken to the operating room for the above-stated procedure. IV antibiotics were given prior to the incision. SCDs were used for DVT prophylaxis. The patient had an estimated intraoperative blood loss of 150 mL. The patient tolerated the procedure well without any complications, and was taken to the recovery room in stable condition. The patient was then transferred to the postoperative orthopedic floor for convalescence, PT, pain management, as well as discharge planning. Physical therapy and occupational therapy initiated their evaluation and treatment and continued to follow the patient until the patient was discharged. For pain control, a femoral nerve block was used for a bolus the day of surgery and then removed. Exparel was injected intraoperatively as well for post-op pain management. The patient then was transitioned over to oral narcotics in which was well tolerated. DVT prophylaxis was initiated on the day of surgery including: aspirin, compression stockings, and bilateral foot pumps.   At the time of discharge, the patient was able to ambulate safely, go up and down stairs and had an understanding of the explicit discharge precautions and instructions following surgery. Home Health will come out to assist the patient with this. The patient was discharged to follow up with Dr. Jennifer Tadeo in approximately 10 to 14 days. Discharge Condition: Good  DISPOSITION: To home. On the day of discharge the patient was afebrile. Vital signs were stable. The patient was in no acute distress. his Left knee incision was clean, dry, and intact. Extremity was warm and well-perfused, distally neurovascularly intact. DISCHARGE INSTRUCTIONS:    The patient will be discharged home on aspirin x 1 month for DVT prophylaxis. Continue physical therapy for range of motion, gait training and strengthening. Continue therapeutic exercises. CPM use t.i.d. x2 hours, 0 to 60, increase 10 degrees every day as tolerated. Follow up in 10 to 14 days with Dr. Jennifer Tadeo. DISCHARGE MEDICATIONS:   Current Discharge Medication List      START taking these medications    Details   oxyCODONE-acetaminophen (PERCOCET) 5-325 mg per tablet Take 1-2 Tabs by mouth every four (4) hours as needed for Pain. Max Daily Amount: 12 Tabs. Qty: 60 Tab, Refills: 0         CONTINUE these medications which have CHANGED    Details   aspirin delayed-release 81 mg tablet Take 1 Tab by mouth two (2) times a day. Qty: 60 Tab, Refills: 0         CONTINUE these medications which have NOT CHANGED    Details   levothyroxine (SYNTHROID) 75 mcg tablet Take 75 mcg by mouth Daily (before breakfast). tamsulosin (FLOMAX) 0.4 mg capsule Take 0.4 mg by mouth daily. cholecalciferol, vitamin D3, (VITAMIN D3) 2,000 unit tab Take  by mouth. fexofenadine-pseudoephedrine (ALLEGRA-D 24 HOUR) 180-240 mg per tablet Take 1 Tab by mouth daily. ascorbic acid, vitamin C, (VITAMIN C) 500 mg tablet Take 500 mg by mouth daily.       ferrous sulfate 325 mg (65 mg iron) tablet Take  by mouth Daily (before breakfast). losartan (COZAAR) 25 mg tablet Take  by mouth daily. citalopram (CELEXA) 20 mg tablet Take 20 mg by mouth daily. atorvastatin (LIPITOR) 40 mg tablet Take 20 mg by mouth daily.          STOP taking these medications       fish oil-dha-epa 1,200-144-216 mg cap Comments:   Reason for Stopping:         GLUCOSAMINE HCL/CHONDR ANDERSON A NA (GLUCOSAMINE-CHONDROITIN) 750-600 mg tab Comments:   Reason for Stopping:

## 2017-04-04 NOTE — PROGRESS NOTES
Problem: Mobility Impaired (Adult and Pediatric)  Goal: *Acute Goals and Plan of Care (Insert Text)  In 1-7 days pt will be able to perform:  ST. Bed mobility: Rolling L to R to L modified independent for positioning. 2. Supine to sit to supine S with HR for meals. 3. Sit to stand to sit S with RW in prep for ambulation. LT. Gait: Ambulate >150ft S with RW, WBAT, for home/community mobility. 2. Stair Negotiation: Ascend/descend >3 steps CGA with HR for home entry. 3. Activity tolerance: Tolerate up in chair 1-2 hours for ADLs. 4. Patient/Family Education: Patient/family to be independent with HEP for follow-up care and safe discharge. Outcome: Resolved/Met Date Met:  17  PHYSICAL THERAPY TREATMENT/DISCHARGE     Patient: Stevphen Cranker (38 y.o. male)  Date: 2017  Diagnosis: OSTEOARTHRITIS LEFT KNEE  Osteoarthritis of left knee <principal problem not specified>  Procedure(s) (LRB):  LEFT TOTAL KNEE REPLACEMENT (Left) 1 Day Post-Op  Precautions: Fall, WBAT  Chart, physical therapy assessment, plan of care and goals were reviewed. ASSESSMENT:  Patient has met all PT goals and is cleared for discharge. Pt is at S level with bed mobility, sit<> stand and ambulation with RW X 175' with slow antalgic gait pattern but with good stability. Up/down 4 steps with BHR and SBA. Pt instructed in and demonstrated TKA ex X 5, AROM  17-97 degrees left knee. Pt instructed in home safety information and verbalized understanding. Pt set up with CPM  0-65 at completion, ice to left knee, needs in reach. Nurse Jonathan Jacobs aware. Recommend HHPT at [facility]. Progression toward goals:  [X]      Goals met  [ ]      Improving appropriately and progressing toward goals  [ ]      Improving slowly and progressing toward goals  [ ]      Not making progress toward goals and plan of care will be adjusted       PLAN:  Patient will be discharged from physical therapy at this time.   Rationale for discharge:  [X] Goals Achieved  [ ] Jes Leggett  [ ] Patient not participating in therapy  [ ] Other:  Discharge Recommendations:  Home Health  Further Equipment Recommendations for Discharge:  N/A       SUBJECTIVE:   Patient stated I am not hurting at ll.       OBJECTIVE DATA SUMMARY:   Critical Behavior:  Neurologic State: Alert, Appropriate for age  Orientation Level: Oriented X4  Cognition: Appropriate decision making, Appropriate for age attention/concentration, Appropriate safety awareness  Safety/Judgement: Fall prevention  Functional Mobility Training:  Bed Mobility:  Sit to Supine: Supervision  Scooting: Supervision  Transfers:  Sit to Stand: Supervision  Stand to Sit: Supervision  Balance:  Sitting: Intact  Standing: Intact; With support  Ambulation/Gait Training:  Distance (ft): 175 Feet (ft)  Assistive Device: Walker, rolling;Gait belt  Ambulation - Level of Assistance: Supervision  Gait Abnormalities: Antalgic;Decreased step clearance (decreased L knee flexion with swing)  Left Side Weight Bearing: As tolerated  Base of Support: Shift to right  Stance: Right decreased  Speed/Sheron: Slow  Step Length: Right shortened;Left shortened  Swing Pattern: Right asymmetrical  Interventions: Verbal cues; Safety awareness training     Stairs:  Number of Stairs Trained: 4  Stairs - Level of Assistance: Stand-by asssistance                Therapeutic Exercises:   Patient performed 5 reps of TKA exercises per protocol for left lower extremity(s), including supine ankle pumps, ankle circles quad sets, hamstringsets, heel slides, straight leg raises, short arc quads/ terminal knee extension, seated heel slides/ kneeflexion exercises. Pain:  Pain Scale 1: Numeric (0 - 10)  Pain Intensity 1: 6  Pain Location 1: Knee  Pain Orientation 1: Left  Pain Description 1: Aching  Pain Intervention(s) 1: Medication (see MAR)  Activity Tolerance:   good  Please refer to the flowsheet for vital signs taken during this treatment.   After treatment:   [ ] Patient left in no apparent distress sitting up in chair  [X] Patient left in no apparent distress in bed  [X] Call bell left within reach  [X] Nursing notified  [ ] Caregiver present  [ ] Bed alarm activated  Yamilka Art, PT   Time Calculation: 31 mins  Mobility  D/C  CI= 1-19%. The severity rating is based on the Level of Assistance required for Functional Mobility and ADLs.

## 2017-04-04 NOTE — PROGRESS NOTES
Progress Note     Patient: Anali Arreaga MRN: [id number]  SSN: [id number]    YOB: 1940  Age: 78 y.o. Sex: male      POD:    1 Day Post-Op  S/P:    Procedure(s):  LEFT TOTAL KNEE REPLACEMENT     Subjective:   Pt is without complaints of pain. Patient denies significant . He states he has not worked with therapy yet. Patient denies calf pain or tenderness. Objective:     Patient Vitals for the past 12 hrs:   BP Temp Pulse Resp SpO2   04/04/17 0735 93/55 98.7 °F (37.1 °C) 66 16 91 %   04/04/17 0315 101/68 98 °F (36.7 °C) 65 16 95 %   04/03/17 2308 110/65 98.1 °F (36.7 °C) 66 17 93 %     Recent Labs      04/04/17   0124   HGB  11.3*   HCT  33.0*   NA  134*   K  4.8   CL  102   CO2  31   BUN  20*   CREA  1.04   GLU  113*       Pt. resting in bed. Lower extremity operative dressing clean/dry/intact. Neurovascularly intact. Calves soft, nontender. Assessment:     Awake and alert. In good spirits. Patient has yet to work with PT. Plan:   1. Continue pain management/ ice to operative area. 2. Continue to progress with PT/OT. 3. Discharge planning to home with home health later today if his pain is controlled and he passes physical therapy.

## 2017-04-04 NOTE — DIABETES MGMT
GLYCEMIC CONTROL SCREENING INITIATED:  -preoperative lab A1C meets criteria for prediabetes  -spoke with patient and encouraged to follow up with [facility] for continued monitoring  -prediabetes written education material provided  -outpatient class scheduled provided    Lab Results   Component Value Date/Time    Hemoglobin A1c 6.0 03/09/2017 08:15 AM      Lab Results   Component Value Date/Time    Glucose 113 04/04/2017 01:24 AM         []  Glucose values exceeding inpatient target range: -180mg/dl            non-ICU 70-180mg/dl      [x]  Patient meets criteria for pre-diabetes   HbA1c = 5.7-6.4%      []  Patient meets criteria for diabetes HbA1c ? 6.5%      []  Recommend discontinuing oral anti-diabetic medications until discharge. []  Recommend basal insulin per IP Insulin order set. []  Recommend meal time insulin per IP Insulin order set. []  Recommend corrective insulin per IP Insulin order set. []  Standard insulin scale  i[]  Very insuln resistant scale       []  Patient meets criteria for IV Insulin Infusion/GlucoStabilizer order set. []  Patient has had a hypoglycemic event, recommend      []  Recommend blood glucose monitoring while patient is on steroids. []  Patient will need prescription for glucometer, test strips and lancets. []  Recommend carbohydrate controlled diabetic diet. [x]  Diabetes Self-Management class schedule provided and patient encouraged to attend.     [] Other    Julio Lucero RN, MS  Glycemic Control Team

## 2017-04-04 NOTE — PROGRESS NOTES
Met with pt in room. Pt plans discharge home with wife able to assist prn. FOC offered and pt chose [facility] [address] [place] [address] [phone] for follow up; referral placed with CMS. [facility] [phone] to deliver RW to pt room prior to discharge, pt provided number to contact for CPM delivery. Care Management Interventions  PCP Verified by CM:  Yes  Transition of Care Consult (CM Consult): [address]: No  Reason Outside [place]: Physician referred to specific agency ([facility])  Discharge Durable Medical Equipment: Yes Dianna Cox [place] to deliver RW and CPM to pt)  Physical Therapy Consult: Yes  Occupational Therapy Consult: Yes  Current Support Network: Lives with Spouse, Own Home  Confirm Follow Up Transport: Family  Plan discussed with Pt/Family/Caregiver: Yes  Freedom of Choice Offered: Yes  Discharge Location  Discharge Placement: Home with home health

## 2017-04-04 NOTE — DISCHARGE INSTRUCTIONS
Total Knee Arthroplasty Discharge Instructions           Dr. Daniel Woodruff    Please take the time to review the following instructions before you leave the hospital and use them as guidelines during your recovery from surgery. If you have any questions you may contact my office at [phone]. Wound Care/Dressing Changes: You may change your dressing as needed. Beginning the 2 days after you are discharged from the hospital you should change your dressing daily. A big, bulky dressing isn't necessary as long as there isn't any drainage from the incisions. You can put a band-aid or Mepilex dressing over the incision and wear MOHAN hose as needed for comfort and swelling. No dressing is necessary if there is no drainage. It isn't necessary to apply antibiotic ointment to your incisions. Prineo tape will peel off in approximately 2-6 weeks. It does not need to be removed prior to that. When it begins to peel off you can cut the edges away with scissors. Showering/Bathing:    [x]   You may shower 2 days after surgery. Your dressing may be removed for showering. You may get your incisions wet in the shower. Don't vigorously scrub the area where your incisions are. Apply a clean, dry dressing after drying off the area of your incisions. Don't take a tub bath, get in a swimming pool or Jacuzzi until the incisions are completely healed, which is about 14 days. Do not soak your incisions under water. Weight Bearing Status/Activity:          You may walk as tolerated and perform your normal daily activities. Use a walker or a   cane only if you need them. Continue CPM use three times daily for 2 hrs at a time,   increase flexion by 10 degrees daily. You should strive to achieve full range of motion in   your knee as tolerated. We would like for you to return to your normal activities as soon   as possible.       Ice/Elevation:    Continue ice and elevation as needed for pain and swelling. Diet:    Resume your prehospital diet. If you have excessive nausea or vomitting call your doctor's office. Medications:      1. You will be given a prescription for pain medications when you are discharged from the hospital.  Take the medication as needed according to the directions on the prescription bottle. Possible side effects of the medication include dizziness, headache, nausea, vomiting, constipation and urinary retention. If you experience any of these side effects call the office so that we can assist you in relieving them. Discontinue the use of the pain medication if you develop itching, rash, shortness of breath or difficulties swallowing. If these symptoms become severe or aren't relieved by discontinuing the medication you should seek immediate medical attention. Refills of pain medication are authorized during office hours only. (8AM - 5PM Mon thru Fri)  2. If you were prescribed Percocet/oxycodone or Dilaudid/hydromorphone you must have a written prescription. These medications legally CANNOT be called in to a pharmacy. 3. Do not take Tylenol in addition to your pain medication as most of the pain medication already contains Tylenol. Do not exceed 4000 mg of Tylenol per day. Ex:  (hydrocodon 5/500mg = 500 mg of Tylenol)  4. You may resume the medication you were taking prior to your surgery. Pain medication may change the effects of any antidepressant medication. If you have any questions about possible interactions between your regular medications and the pain medication you should consult the physician who prescribes your regular medications. Stool Softeners:    Pain medications can cause constipation. Stool softeners, warm prune juice and increasing your water and fiber intake can help prevent constipation. Do not take laxatives. Blood Thinner: You will be sent home on 81mg aspirin to take two times a day for 30 days in order to prevent blood clots. Home Health:  Begin In-Home Physical Therapy; 3 times a week to work on gait training, range of motion, strengthening, and weight bearing exercises as tolerable. Home health has been arranged for skilled nursing visits and physical therapy. If no one from the agency calls you on the day after you arrive home, please contact them at the number provided at discharge. Physical Therapy for gait training, joint range of motion and strengthening. Continue to use the CPM Machine from 0-60 degrees, increasing 10 degrees daily as tolerable. Patient may use the CPM Machine for 2 hour sessions, 3 times daily (alternating legs, if bilateral). Patient may continue to use the CPM Machine daily until the required date of return established by the patient's insurance. Follow Up Appointment:     Please call [phone] for a follow appointment with Dr. Phu Licea in 10-14 days from the time of your surgery. Please let our office know you are scheduling a post-op appointment. Signs and Symptoms to be Aware of: If any of the following signs and symptoms occur, you should contact Dr. Mahendra Mercado office. Please be advised if a problem arises which you feel requires immediate medical attention or you are unable to contact Dr. Mhaendra Mercado office you should seek immediate medical attention at the emergency department or other health care facility you have access to. Signs and symptoms to watch for include:     1. A sudden increase in swelling and l or redness or warmth at the area your surgery was performed which isn't relieved by rest, ice and elevation. 2 Oral temperature greater than 101.5 degrees for 12 hours or more which isn't relieved by an increase in fluid intake and taking two Tylenol every 4-6 hours. 3 Excessive drainage from your incisions, or drainage which hasn't stopped by 72 hours after your surgery despite applying a compressive dressing, ice and elevation.    4 Calfpain, tenderness, redness or swelling which isn't relieved with rest and elevation. 5 Fever, chills, shortness ofbreath, chest pain, nausea, vomiting or other signs and symptoms which are of concern to you. Other Instructions:      Lab Results   Component Value Date/Time    Hemoglobin A1c 6.0 03/09/2017 08:15 AM       This lab test reflects that your blood sugar has been slightly elevated over the past 3 months and should be evaluated by your primary care provider. An A1C of 5.7-6.4% meets the criteria for pre-diabetes; an A1C of 6.5% or higher meets the criteria for diabetes. This lab test reflects that your blood sugar averaged 125 mg/dL  over the past 3 months. It is important to follow up with your provider on a routine basis to continue to evaluate your blood sugar and discuss any necessary changes in treatment.

## 2018-07-17 ENCOUNTER — HOSPITAL ENCOUNTER (EMERGENCY)
Age: 78
Discharge: HOME | End: 2018-07-18
Attending: INTERNAL MEDICINE | Admitting: INTERNAL MEDICINE
Payer: MEDICARE

## 2018-07-17 DIAGNOSIS — N30.00 ACUTE CYSTITIS WITHOUT HEMATURIA: Primary | ICD-10-CM

## 2018-07-17 DIAGNOSIS — N32.89 BLADDER SPASM: ICD-10-CM

## 2018-07-17 LAB
ALBUMIN SERPL-MCNC: 2.9 G/DL (ref 3.4–5)
ALBUMIN/GLOB SERPL: 0.7 {RATIO} (ref 0.8–1.7)
ALP SERPL-CCNC: 75 U/L (ref 45–117)
ALT SERPL-CCNC: 24 U/L (ref 16–61)
ANION GAP SERPL CALC-SCNC: 10 MMOL/L (ref 3–18)
APPEARANCE UR: ABNORMAL
AST SERPL-CCNC: 15 U/L (ref 15–37)
BACTERIA URNS QL MICRO: ABNORMAL /HPF
BASOPHILS # BLD: 0 K/UL (ref 0–0.06)
BASOPHILS NFR BLD: 0 % (ref 0–2)
BILIRUB SERPL-MCNC: 0.4 MG/DL (ref 0.2–1)
BILIRUB UR QL: NEGATIVE
BUN SERPL-MCNC: 22 MG/DL (ref 7–18)
BUN/CREAT SERPL: 20 (ref 12–20)
CALCIUM SERPL-MCNC: 8.5 MG/DL (ref 8.5–10.1)
CHLORIDE SERPL-SCNC: 103 MMOL/L (ref 100–108)
CO2 SERPL-SCNC: 26 MMOL/L (ref 21–32)
COLOR UR: YELLOW
CREAT SERPL-MCNC: 1.11 MG/DL (ref 0.6–1.3)
DIFFERENTIAL METHOD BLD: ABNORMAL
EOSINOPHIL # BLD: 0.1 K/UL (ref 0–0.4)
EOSINOPHIL NFR BLD: 1 % (ref 0–5)
EPITH CASTS URNS QL MICRO: ABNORMAL /LPF (ref 0–5)
ERYTHROCYTE [DISTWIDTH] IN BLOOD BY AUTOMATED COUNT: 13 % (ref 11.6–14.5)
GLOBULIN SER CALC-MCNC: 4.2 G/DL (ref 2–4)
GLUCOSE SERPL-MCNC: 90 MG/DL (ref 74–99)
GLUCOSE UR STRIP.AUTO-MCNC: NEGATIVE MG/DL
HCT VFR BLD AUTO: 37.5 % (ref 36–48)
HGB BLD-MCNC: 12.7 G/DL (ref 13–16)
HGB UR QL STRIP: ABNORMAL
KETONES UR QL STRIP.AUTO: NEGATIVE MG/DL
LACTATE SERPL-SCNC: 0.9 MMOL/L (ref 0.4–2)
LEUKOCYTE ESTERASE UR QL STRIP.AUTO: ABNORMAL
LYMPHOCYTES # BLD: 1.9 K/UL (ref 0.9–3.6)
LYMPHOCYTES NFR BLD: 10 % (ref 21–52)
MCH RBC QN AUTO: 32.9 PG (ref 24–34)
MCHC RBC AUTO-ENTMCNC: 33.9 G/DL (ref 31–37)
MCV RBC AUTO: 97.2 FL (ref 74–97)
MONOCYTES # BLD: 1.4 K/UL (ref 0.05–1.2)
MONOCYTES NFR BLD: 7 % (ref 3–10)
NEUTS SEG # BLD: 14.8 K/UL (ref 1.8–8)
NEUTS SEG NFR BLD: 82 % (ref 40–73)
NITRITE UR QL STRIP.AUTO: NEGATIVE
PH UR STRIP: 5 [PH] (ref 5–8)
PLATELET # BLD AUTO: 156 K/UL (ref 135–420)
PMV BLD AUTO: 9.3 FL (ref 9.2–11.8)
POTASSIUM SERPL-SCNC: 3.9 MMOL/L (ref 3.5–5.5)
PROT SERPL-MCNC: 7.1 G/DL (ref 6.4–8.2)
PROT UR STRIP-MCNC: ABNORMAL MG/DL
RBC # BLD AUTO: 3.86 M/UL (ref 4.7–5.5)
RBC #/AREA URNS HPF: ABNORMAL /HPF (ref 0–5)
SODIUM SERPL-SCNC: 139 MMOL/L (ref 136–145)
SP GR UR REFRACTOMETRY: 1.02 (ref 1–1.03)
UROBILINOGEN UR QL STRIP.AUTO: 1 EU/DL (ref 0.2–1)
WBC # BLD AUTO: 18.2 K/UL (ref 4.6–13.2)
WBC URNS QL MICRO: ABNORMAL /HPF (ref 0–5)

## 2018-07-17 PROCEDURE — 80053 COMPREHEN METABOLIC PANEL: CPT | Performed by: PHYSICIAN ASSISTANT

## 2018-07-17 PROCEDURE — 87086 URINE CULTURE/COLONY COUNT: CPT | Performed by: PHYSICIAN ASSISTANT

## 2018-07-17 PROCEDURE — 99285 EMERGENCY DEPT VISIT HI MDM: CPT

## 2018-07-17 PROCEDURE — 83605 ASSAY OF LACTIC ACID: CPT | Performed by: PHYSICIAN ASSISTANT

## 2018-07-17 PROCEDURE — 74011250636 HC RX REV CODE- 250/636: Performed by: PHYSICIAN ASSISTANT

## 2018-07-17 PROCEDURE — 51798 US URINE CAPACITY MEASURE: CPT

## 2018-07-17 PROCEDURE — 74011000250 HC RX REV CODE- 250: Performed by: PHYSICIAN ASSISTANT

## 2018-07-17 PROCEDURE — 81001 URINALYSIS AUTO W/SCOPE: CPT | Performed by: PHYSICIAN ASSISTANT

## 2018-07-17 PROCEDURE — 96374 THER/PROPH/DIAG INJ IV PUSH: CPT

## 2018-07-17 PROCEDURE — 74011250637 HC RX REV CODE- 250/637: Performed by: PHYSICIAN ASSISTANT

## 2018-07-17 PROCEDURE — 96361 HYDRATE IV INFUSION ADD-ON: CPT

## 2018-07-17 PROCEDURE — 85025 COMPLETE CBC W/AUTO DIFF WBC: CPT | Performed by: PHYSICIAN ASSISTANT

## 2018-07-17 PROCEDURE — 87040 BLOOD CULTURE FOR BACTERIA: CPT | Performed by: PHYSICIAN ASSISTANT

## 2018-07-17 PROCEDURE — 96375 TX/PRO/DX INJ NEW DRUG ADDON: CPT

## 2018-07-17 RX ORDER — MORPHINE SULFATE 2 MG/ML
2 INJECTION, SOLUTION INTRAMUSCULAR; INTRAVENOUS ONCE
Status: COMPLETED | OUTPATIENT
Start: 2018-07-17 | End: 2018-07-17

## 2018-07-17 RX ORDER — ACETAMINOPHEN 325 MG/1
650 TABLET ORAL
COMMUNITY

## 2018-07-17 RX ORDER — SULFAMETHOXAZOLE AND TRIMETHOPRIM 800; 160 MG/1; MG/1
1 TABLET ORAL 2 TIMES DAILY
Qty: 20 TAB | Refills: 0 | Status: SHIPPED | OUTPATIENT
Start: 2018-07-17 | End: 2018-07-27

## 2018-07-17 RX ORDER — PHENAZOPYRIDINE HYDROCHLORIDE 100 MG/1
200 TABLET, FILM COATED ORAL
Status: COMPLETED | OUTPATIENT
Start: 2018-07-17 | End: 2018-07-17

## 2018-07-17 RX ORDER — CIPROFLOXACIN 500 MG/1
500 TABLET ORAL 2 TIMES DAILY
COMMUNITY
End: 2018-07-17

## 2018-07-17 RX ORDER — OXYBUTYNIN CHLORIDE 10 MG/1
10 TABLET, EXTENDED RELEASE ORAL
Status: COMPLETED | OUTPATIENT
Start: 2018-07-17 | End: 2018-07-17

## 2018-07-17 RX ORDER — PHENAZOPYRIDINE HYDROCHLORIDE 200 MG/1
200 TABLET, FILM COATED ORAL 3 TIMES DAILY
Qty: 9 TAB | Refills: 0 | Status: SHIPPED | OUTPATIENT
Start: 2018-07-17 | End: 2018-07-19

## 2018-07-17 RX ADMIN — PHENAZOPYRIDINE HYDROCHLORIDE 200 MG: 100 TABLET ORAL at 20:38

## 2018-07-17 RX ADMIN — SODIUM CHLORIDE 259 ML: 900 INJECTION, SOLUTION INTRAVENOUS at 19:46

## 2018-07-17 RX ADMIN — MORPHINE SULFATE 2 MG: 2 INJECTION, SOLUTION INTRAMUSCULAR; INTRAVENOUS at 22:58

## 2018-07-17 RX ADMIN — OXYBUTYNIN CHLORIDE 10 MG: 10 TABLET, FILM COATED, EXTENDED RELEASE ORAL at 23:36

## 2018-07-17 RX ADMIN — SODIUM CHLORIDE 1000 ML: 900 INJECTION, SOLUTION INTRAVENOUS at 19:44

## 2018-07-17 RX ADMIN — WATER 1 G: 1 INJECTION INTRAMUSCULAR; INTRAVENOUS; SUBCUTANEOUS at 17:11

## 2018-07-17 NOTE — ED TRIAGE NOTES
Unable to urinate; Last time was this am which was a few drops;   Hx of urinary retention;  Seen yesterday by Dr Destiny Cotton and placed on abx for urinary tract infection;  Called to Dr Phuc Vang office and told to come here since he is running a fever  Had tylenol 1 hour ago

## 2018-07-17 NOTE — ED NOTES
Pt c/o suprapubic pressure when he feels he has to urinate, doesn't feel like he is emptying bladder. Started on Cipro yesterday for UTI, feels pain has increased today.

## 2018-07-17 NOTE — ED PROVIDER NOTES
[address]  EMERGENCY DEPARTMENT HISTORY AND PHYSICAL EXAM    Date: 7/17/2018  Patient Name: Annie Carrillo  YOB: 1940  Medical Record Number: [id number]     History of Presenting Illness     Chief Complaint   Patient presents with    Fever    Urinary Retention    Bladder Infection       History Provided By: Patient    Chief Complaint: Urinary retention  Duration: 2 Days  Timing:  Worsening  Location: Bladder  Quality: Retention  Severity: Moderate  Modifying Factors: No relieving or worsening factors  Associated Symptoms: Fever (tmax of 101 F, 97.9 F in ED), difficulty urinating    Additional History (Context):   3:15 PM  Annie Carrillo is a 70 y.o. male with PMHX urinary retention and prostatitis, who presents to the emergency department C/O gradually worsening urinary retention x 2 days, now not able to void anything for last 3 hours. Last urination was a few drops this morning. Associated symptoms include fever (tmax of 101 F, 97.9 F in ED), difficulty urinating, dysuria, hematuria, and urinary urgency. Feels that he cannot completely void bladder. Pt was seen by Dr. Gisella Smalls yesterday for this, had outpatient labs, and was placed on Cipro for UTI. Pt had spoken to his urologist, is supposed to see him later today, but was referred to ED. Pt denies chills and any other Sx or complaints. PCP: Kourtney Masterson. Ancelmo Herrera DO  Specialist: Godfrey Mei MD (Urologist)     Current Outpatient Prescriptions   Medication Sig Dispense Refill    oxybutynin chloride XL (DITROPAN XL) 10 mg CR tablet Take 1 Tab by mouth daily. 7 Tab 0    acetaminophen (TYLENOL) 325 mg tablet Take 650 mg by mouth every four (4) hours as needed for Pain.  trimethoprim-sulfamethoxazole (BACTRIM DS) 160-800 mg per tablet Take 1 Tab by mouth two (2) times a day for 10 days. 20 Tab 0    phenazopyridine (PYRIDIUM) 200 mg tablet Take 1 Tab by mouth three (3) times daily for 2 days.  9 Tab 0    aspirin delayed-release 81 mg tablet Take 1 Tab by mouth two (2) times a day. 60 Tab 0    levothyroxine (SYNTHROID) 75 mcg tablet Take 75 mcg by mouth Daily (before breakfast).  citalopram (CELEXA) 20 mg tablet Take 20 mg by mouth daily.  tamsulosin (FLOMAX) 0.4 mg capsule Take 0.4 mg by mouth daily.  cholecalciferol, vitamin D3, (VITAMIN D3) 2,000 unit tab Take  by mouth.  fexofenadine-pseudoephedrine (ALLEGRA-D 24 HOUR) 180-240 mg per tablet Take 1 Tab by mouth daily.  ascorbic acid, vitamin C, (VITAMIN C) 500 mg tablet Take 500 mg by mouth daily.  ferrous sulfate 325 mg (65 mg iron) tablet Take  by mouth Daily (before breakfast).  losartan (COZAAR) 25 mg tablet Take  by mouth daily.  atorvastatin (LIPITOR) 40 mg tablet Take 20 mg by mouth daily. Past History     Past Medical History:  Past Medical History:   Diagnosis Date    Arthritis     Cancer ([facility] [address]     skin    GERD (gastroesophageal reflux disease)     Hypercholesteremia     Hypertension 1999    Prostatitis     Thyroid disease        Past Surgical History:  Past Surgical History:   Procedure Laterality Date    HX HERNIA REPAIR      x2    HX KNEE ARTHROSCOPY Left     x2    HX ROTATOR CUFF REPAIR Left        Family History:  History reviewed. No pertinent family history. Social History:  Social History   Substance Use Topics    Smoking status: Never Smoker    Smokeless tobacco: Never Used    Alcohol use No       Allergies:  No Known Allergies      Review of Systems     Review of Systems   Constitutional: Positive for fever. Negative for chills. Genitourinary: Positive for difficulty urinating, dysuria, hematuria and urgency. All other systems reviewed and are negative.       Physical Exam     Vitals:    07/17/18 2236 07/17/18 2300 07/17/18 2330 07/18/18 0000   BP: 138/76 130/70 113/56 120/76   Pulse:       Resp:       Temp:       SpO2: 93% 95% 92%    Weight:       Height: Physical Exam   Constitutional: He is oriented to person, place, and time. He appears well-developed and well-nourished. No distress. HENT:   Head: Normocephalic and atraumatic. Eyes: Conjunctivae and EOM are normal. Pupils are equal, round, and reactive to light. Neck: Normal range of motion. Neck supple. Cardiovascular: Normal rate and regular rhythm. Pulmonary/Chest: Effort normal and breath sounds normal.   Abdominal: Soft. Bowel sounds are normal.   Musculoskeletal: Normal range of motion. Neurological: He is alert and oriented to person, place, and time. Skin: Skin is warm and dry. Psychiatric: He has a normal mood and affect. His behavior is normal.   Nursing note and vitals reviewed.       Diagnostic Study Results     Labs -     Recent Results (from the past 12 hour(s))   URINALYSIS W/ RFLX MICROSCOPIC    Collection Time: 07/17/18  3:30 PM   Result Value Ref Range    Color YELLOW      Appearance CLOUDY      Specific gravity 1.023 1.005 - 1.030      pH (UA) 5.0 5.0 - 8.0      Protein TRACE (A) NEG mg/dL    Glucose NEGATIVE  NEG mg/dL    Ketone NEGATIVE  NEG mg/dL    Bilirubin NEGATIVE  NEG      Blood SMALL (A) NEG      Urobilinogen 1.0 0.2 - 1.0 EU/dL    Nitrites NEGATIVE  NEG      Leukocyte Esterase LARGE (A) NEG     URINE MICROSCOPIC ONLY    Collection Time: 07/17/18  3:30 PM   Result Value Ref Range    WBC 80 to 100 0 - 5 /hpf    RBC 0 to 1 0 - 5 /hpf    Epithelial cells 1+ 0 - 5 /lpf    Bacteria 1+ (A) NEG /hpf   CBC WITH AUTOMATED DIFF    Collection Time: 07/17/18  4:42 PM   Result Value Ref Range    WBC 18.2 (H) 4.6 - 13.2 K/uL    RBC 3.86 (L) 4.70 - 5.50 M/uL    HGB 12.7 (L) 13.0 - 16.0 g/dL    HCT 37.5 36.0 - 48.0 %    MCV 97.2 (H) 74.0 - 97.0 FL    MCH 32.9 24.0 - 34.0 PG    MCHC 33.9 31.0 - 37.0 g/dL    RDW 13.0 11.6 - 14.5 %    PLATELET 510 456 - 058 K/uL    MPV 9.3 9.2 - 11.8 FL    NEUTROPHILS 82 (H) 40 - 73 %    LYMPHOCYTES 10 (L) 21 - 52 %    MONOCYTES 7 3 - 10 % EOSINOPHILS 1 0 - 5 %    BASOPHILS 0 0 - 2 %    ABS. NEUTROPHILS 14.8 (H) 1.8 - 8.0 K/UL    ABS. LYMPHOCYTES 1.9 0.9 - 3.6 K/UL    ABS. MONOCYTES 1.4 (H) 0.05 - 1.2 K/UL    ABS. EOSINOPHILS 0.1 0.0 - 0.4 K/UL    ABS. BASOPHILS 0.0 0.0 - 0.06 K/UL    DF AUTOMATED     METABOLIC PANEL, COMPREHENSIVE    Collection Time: 07/17/18  4:42 PM   Result Value Ref Range    Sodium 139 136 - 145 mmol/L    Potassium 3.9 3.5 - 5.5 mmol/L    Chloride 103 100 - 108 mmol/L    CO2 26 21 - 32 mmol/L    Anion gap 10 3.0 - 18 mmol/L    Glucose 90 74 - 99 mg/dL    BUN 22 (H) 7.0 - 18 MG/DL    Creatinine 1.11 0.6 - 1.3 MG/DL    BUN/Creatinine ratio 20 12 - 20      GFR est AA >60 >60 ml/min/1.73m2    GFR est non-AA >60 >60 ml/min/1.73m2    Calcium 8.5 8.5 - 10.1 MG/DL    Bilirubin, total 0.4 0.2 - 1.0 MG/DL    ALT (SGPT) 24 16 - 61 U/L    AST (SGOT) 15 15 - 37 U/L    Alk.  phosphatase 75 45 - 117 U/L    Protein, total 7.1 6.4 - 8.2 g/dL    Albumin 2.9 (L) 3.4 - 5.0 g/dL    Globulin 4.2 (H) 2.0 - 4.0 g/dL    A-G Ratio 0.7 (L) 0.8 - 1.7     LACTIC ACID    Collection Time: 07/17/18  5:53 PM   Result Value Ref Range    Lactic acid 0.9 0.4 - 2.0 MMOL/L       Radiologic Studies -   No orders to display     Medications Given in the ED:  Medications   cefTRIAXone (ROCEPHIN) 1 g in sterile water (preservative free) 10 mL IV syringe (1 g IntraVENous Given 7/17/18 1711)   sodium chloride 0.9 % bolus infusion 1,000 mL (0 mL IntraVENous IV Completed 7/17/18 2039)     Followed by   sodium chloride 0.9 % bolus infusion 1,000 mL (0 mL IntraVENous IV Completed 7/17/18 2039)     Followed by   sodium chloride 0.9 % bolus infusion 259 mL (0 mL IntraVENous IV Completed 7/17/18 2039)   phenazopyridine (PYRIDIUM) tablet 200 mg (200 mg Oral Given 7/17/18 2038)   oxybutynin chloride XL (DITROPAN XL) tablet 10 mg (10 mg Oral Given 7/17/18 2336)   morphine injection 2 mg (2 mg IntraVENous Given 7/17/18 2258)        Medical Decision Making     I am the first provider for this patient. I reviewed the vital signs, available nursing notes, past medical history, past surgical history, family history and social history. Records Reviewed: Nursing Notes    Vital Signs-Reviewed the patient's vital signs. Pulse Oximetry Analysis - Normal 99% on RA        Procedures:  Procedures     ED Course:   3:15 PM Initial assessment performed. Pt and/or pt's family are aware of the plan of care and are in agreement. Impression:  Acute cystitis with bladder spasms. Better after ditropan. Per Dr. Elza Martin, suggests switching patient to Bactrim. Noted ARB use for HTN, did give warning about potassium and asked patient to get his potassium checked in the next two days. He is to follow with urology outpatient on Thursday. He is urinating, although smaller amounts, repeat bladder checks still show only 100 ml residual. Do not think Catheter is warranted tonight and Dr. Saravanan Maldonado agrees. Patient agrees. Boubacar Marion      SIGN OUT:  5:10 PM  Patient's presentation, labs/imaging and plan of care was reviewed with ALLAN Hurst as part of sign out. They will follow up on labs and disposition as part of the plan discussed with the patient. ALLAN Hurst's assistance in completion of this plan is greatly appreciated but it should be noted that I will be the provider of record for this patient. Paul Will PA-C     7:30 PM PM Discussed patient's history, exam, and available diagnostics results with Merlyn Gaspar. Saravanan Maldonado MD, ED Attending, who agrees with plan to discuss with Dr. Hardeep Reveles MD (Urology). Agrees with fluid challenge to see if pt can urinate. We both agree this is likely bladder spasms causing urinary retention. Agrees with discussing whether or not catheter is appropriate for pt with Dr. Elza Martin.      7:46 PM Discussed patient's history, exam, and available diagnostics results with Dr. Hardeep Reveles MD (Urology), he doesn't think right now that pt needs catheter since pt has been able to give specimen while in ED. Thinks this is more of a bladder spasm. Suggest giving pyridium. Also disucssed Cipro, whether this is best choice for pt. Suggests changing to Bactrim. States pt can call office tomorrow to make appointment to see Rozina Martines MD (Urologist) to make an appointment for Thursday. If pt feels he is retaining again, he can call office and they'd be happy to put a catheter in.     7:58 PM Rounded on pt. Discussed with him and wife conversation with Dr. Dudley Pierre MD (Urology). Tells me he is feeling more comfortable after IVF. Will give pyridium now as challenge to see if will urinate. Pt did c/o chills, recheck of temp was 98.3 F. BP and other vitals stable. 10:34 PM Consult Note: Discussed patient's history, exam, and available diagnostics results in person with Madison Hagen. Teodoro Alpers, MD, ED Attending, who agrees to evaluate face to face. 10:41 PM Face-to-Face Progress Note:  ED Attending was requested to see pt by the MATHEW. Evaluated pt face-to-face. 10:53 PM Dr. Teodoro Alpers rounded on pt and examined him. Discussed how catheter is not appropriate given his lack of volume on bladder scan. Will try to give pt IV morphine here and Ditropan. Plan to d/c once pt is feeling a little bit better. Diagnosis and Disposition       Discharge Note:  12:23 AM  Nikko Joseph's  results have been reviewed with him. He has been counseled regarding his diagnosis, treatment, and plan. He verbally conveys understanding and agreement of the signs, symptoms, diagnosis, treatment and prognosis and additionally agrees to follow up as discussed. He also agrees with the care-plan and conveys that all of his questions have been answered.   I have also provided discharge instructions for him that include: educational information regarding their diagnosis and treatment, and list of reasons why they would want to return to the ED prior to their follow-up appointment, should his condition change. He has been provided with education for proper emergency department utilization. Clinical Impression:    1. Acute cystitis without hematuria    2. Bladder spasm        PLAN:  1. D/C Home  2. Current Discharge Medication List      START taking these medications    Details   oxybutynin chloride XL (DITROPAN XL) 10 mg CR tablet Take 1 Tab by mouth daily. Qty: 7 Tab, Refills: 0      trimethoprim-sulfamethoxazole (BACTRIM DS) 160-800 mg per tablet Take 1 Tab by mouth two (2) times a day for 10 days. Qty: 20 Tab, Refills: 0      phenazopyridine (PYRIDIUM) 200 mg tablet Take 1 Tab by mouth three (3) times daily for 2 days. Qty: 9 Tab, Refills: 0         CONTINUE these medications which have NOT CHANGED    Details   acetaminophen (TYLENOL) 325 mg tablet Take 650 mg by mouth every four (4) hours as needed for Pain. aspirin delayed-release 81 mg tablet Take 1 Tab by mouth two (2) times a day. Qty: 60 Tab, Refills: 0      levothyroxine (SYNTHROID) 75 mcg tablet Take 75 mcg by mouth Daily (before breakfast). citalopram (CELEXA) 20 mg tablet Take 20 mg by mouth daily. tamsulosin (FLOMAX) 0.4 mg capsule Take 0.4 mg by mouth daily. cholecalciferol, vitamin D3, (VITAMIN D3) 2,000 unit tab Take  by mouth. fexofenadine-pseudoephedrine (ALLEGRA-D 24 HOUR) 180-240 mg per tablet Take 1 Tab by mouth daily. ascorbic acid, vitamin C, (VITAMIN C) 500 mg tablet Take 500 mg by mouth daily. ferrous sulfate 325 mg (65 mg iron) tablet Take  by mouth Daily (before breakfast). losartan (COZAAR) 25 mg tablet Take  by mouth daily. atorvastatin (LIPITOR) 40 mg tablet Take 20 mg by mouth daily.          STOP taking these medications       ciprofloxacin HCl (CIPRO) 500 mg tablet Comments:   Reason for Stopping:         oxyCODONE-acetaminophen (PERCOCET) 5-325 mg per tablet Comments:   Reason for Stopping:             3.   Follow-up Information     Follow up With Details Comments Contact Info    Nhan Rao MD Schedule an appointment as soon as possible for a visit in 2 days Call tomorrow morning to schedule follow up with your urologist on Thursday [address]      [facility] EMERGENCY DEPT Go to As needed, if symptoms worsen [address] [facility] [id number]  [phone]        _______________________________    Attestations: This note is prepared by Inez Becerra, acting as Scribe for ALLAN Alejandro PA-C:  The scribe's documentation has been prepared under my direction and personally reviewed by me in its entirety.   I confirm that the note above accurately reflects all work, treatment, procedures, and medical decision making performed by me.  _______________________________

## 2018-07-18 VITALS
WEIGHT: 202 LBS | OXYGEN SATURATION: 95 % | HEIGHT: 71 IN | TEMPERATURE: 98.6 F | DIASTOLIC BLOOD PRESSURE: 76 MMHG | BODY MASS INDEX: 28.28 KG/M2 | SYSTOLIC BLOOD PRESSURE: 120 MMHG | HEART RATE: 63 BPM | RESPIRATION RATE: 14 BRPM

## 2018-07-18 RX ORDER — OXYBUTYNIN CHLORIDE 10 MG/1
10 TABLET, EXTENDED RELEASE ORAL DAILY
Qty: 7 TAB | Refills: 0 | Status: SHIPPED | OUTPATIENT
Start: 2018-07-18

## 2018-07-18 NOTE — DISCHARGE INSTRUCTIONS

## 2018-07-18 NOTE — ED NOTES
Bladder scan shows about 120mLs in bladder.  Pt c/o spasms and continued pain  PA Daniel notified of all

## 2018-07-18 NOTE — ED NOTES
Pt c/o continued pain and pressure. Bladder scan shows about 100mLs in bladder.   Pt with output of 80mLs  PA Daniel notified of all

## 2018-07-18 NOTE — ED NOTES
Pt reports slight relief after oxybutynin.   Pt reports continued slight urine output  THERESA Sanchez notified

## 2018-07-18 NOTE — ED NOTES
Pt reports to have 6/10 lower bladder cramps/spasms. Pt being d/c home with d/c instructions/RXs reviewed with pt. Understanding acknowledged by pt. Wife at bedside listening to d/c instructions. Offered WC- pt deferred. NAD observed by pt upon d/c home. Pt ambulatory leaving with his Spouse.

## 2018-07-18 NOTE — ED NOTES
Assumed care of pt. Report received from [facility] currently reports inability to urinate. PA ordered fluids to be given to pt to hydrate to see if pt would be able to urinate. Pt reports having to have hernández catheter about 2 years ago and is on Flomax. Pt c/o pressure and pain in groin area. Pt alert and oriented.  Pt's wife at bedside

## 2018-07-19 LAB
BACTERIA SPEC CULT: NORMAL
SERVICE CMNT-IMP: NORMAL

## 2018-07-23 LAB
BACTERIA SPEC CULT: NORMAL
BACTERIA SPEC CULT: NORMAL
SERVICE CMNT-IMP: NORMAL
SERVICE CMNT-IMP: NORMAL

## (undated) DEVICE — (D)PREP SKN CHLRAPRP APPL 26ML -- CONVERT TO ITEM 371833

## (undated) DEVICE — SUT MONOCRYL PLUS UD 3-0 --

## (undated) DEVICE — KENDALL SCD EXPRESS FOOT CUFF, MEDIUM: Brand: KENDALL SCD

## (undated) DEVICE — SYSTEM SKIN CLSR 22CM DERMBND PRINEO

## (undated) DEVICE — 3M™ COBAN™ SELF-ADHERENT WRAP, 1586S, STERILE, 6 IN X 5 YD (15 CM X 4,5 M), 12 ROLLS/CASE: Brand: 3M™ COBAN™

## (undated) DEVICE — REM POLYHESIVE ADULT PATIENT RETURN ELECTRODE: Brand: VALLEYLAB

## (undated) DEVICE — BLADE ELECTRODE: Brand: EDGE

## (undated) DEVICE — PLUS HANDPIECE WITH SPRAY TIP: Brand: SURGILAV

## (undated) DEVICE — SUT VCRL + 1 36IN CT1 VIO --

## (undated) DEVICE — SYR LR LCK 1ML GRAD NSAF 30ML --

## (undated) DEVICE — DISPOSABLE TOURNIQUET CUFF SINGLE BLADDER, SINGLE PORT AND QUICK CONNECT CONNECTOR: Brand: COLOR CUFF

## (undated) DEVICE — DEVON™ KNEE AND BODY STRAP 60" X 3" (1.5 M X 7.6 CM): Brand: DEVON

## (undated) DEVICE — INTENDED FOR TISSUE SEPARATION, AND OTHER PROCEDURES THAT REQUIRE A SHARP SURGICAL BLADE TO PUNCTURE OR CUT.: Brand: BARD-PARKER ® CARBON RIB-BACK BLADES

## (undated) DEVICE — NEEDLE HYPO 21GA L1.5IN INTRAMUSCULAR S STL LATCH BVL UP

## (undated) DEVICE — SUTURE STRATAFIX SZ 1 L14IN ABSRB VLT L36MM MO-4 TAPERPOINT SXPD2B400

## (undated) DEVICE — SOLUTION IRRIG 3000ML LAC R FLX CONT

## (undated) DEVICE — STERILE TETRA-FLEX CF LF, 6IN X 11 YD: Brand: TETRA-FLEX™ CF

## (undated) DEVICE — GOWN,SIRUS,NONRNF,SETINSLV,2XL,18/CS: Brand: MEDLINE

## (undated) DEVICE — SHEET,DRAPE,40X58,STERILE: Brand: MEDLINE

## (undated) DEVICE — DRAPE TWL SURG 16X26IN BLU ORB04] ALLCARE INC]

## (undated) DEVICE — SUTURE MCRYL SZ 2-0 L36IN ABSRB UD L36MM CT-1 1/2 CIR Y945H

## (undated) DEVICE — SOL IRRIGATION INJ NACL 0.9% 500ML BTL

## (undated) DEVICE — SHEET,DRAPE,70X85,STERILE: Brand: MEDLINE

## (undated) DEVICE — 3M™ STERI-DRAPE™ U-DRAPE 1015: Brand: STERI-DRAPE™

## (undated) DEVICE — SOL IRR STRL H2O 1500ML BTL --

## (undated) DEVICE — STERILE LATEX POWDER-FREE SURGICAL GLOVESWITH NITRILE COATING: Brand: PROTEXIS

## (undated) DEVICE — SINGLE PORT MANIFOLD: Brand: NEPTUNE 2

## (undated) DEVICE — NDL SPNE QNCKE 18GX3.5IN LF --

## (undated) DEVICE — BASIC SINGLE BASIN 1-LF: Brand: MEDLINE INDUSTRIES, INC.

## (undated) DEVICE — OSCILLATING TIP SAW CARTRIDGE: Brand: PRECISION FALCON

## (undated) DEVICE — LIGHT HANDLE: Brand: DEVON

## (undated) DEVICE — TOTAL KNEE PACK-LF: Brand: MEDLINE INDUSTRIES, INC.

## (undated) DEVICE — DRSG FOAM MEPILX PST OP 4X12IN --